# Patient Record
Sex: FEMALE | Race: ASIAN | NOT HISPANIC OR LATINO | URBAN - METROPOLITAN AREA
[De-identification: names, ages, dates, MRNs, and addresses within clinical notes are randomized per-mention and may not be internally consistent; named-entity substitution may affect disease eponyms.]

---

## 2024-03-27 ENCOUNTER — INPATIENT (INPATIENT)
Facility: HOSPITAL | Age: 39
LOS: 4 days | Discharge: HOME | DRG: 56 | End: 2024-04-01
Attending: PSYCHIATRY & NEUROLOGY | Admitting: PSYCHIATRY & NEUROLOGY
Payer: COMMERCIAL

## 2024-03-27 VITALS
OXYGEN SATURATION: 96 % | DIASTOLIC BLOOD PRESSURE: 86 MMHG | SYSTOLIC BLOOD PRESSURE: 122 MMHG | RESPIRATION RATE: 20 BRPM | HEART RATE: 87 BPM

## 2024-03-27 DIAGNOSIS — F41.8 OTHER SPECIFIED ANXIETY DISORDERS: ICD-10-CM

## 2024-03-27 DIAGNOSIS — R41.89 OTHER SYMPTOMS AND SIGNS INVOLVING COGNITIVE FUNCTIONS AND AWARENESS: ICD-10-CM

## 2024-03-27 DIAGNOSIS — Z87.898 PERSONAL HISTORY OF OTHER SPECIFIED CONDITIONS: ICD-10-CM

## 2024-03-27 DIAGNOSIS — Z29.9 ENCOUNTER FOR PROPHYLACTIC MEASURES, UNSPECIFIED: ICD-10-CM

## 2024-03-27 DIAGNOSIS — F42.9 OBSESSIVE-COMPULSIVE DISORDER, UNSPECIFIED: ICD-10-CM

## 2024-03-27 DIAGNOSIS — F29 UNSPECIFIED PSYCHOSIS NOT DUE TO A SUBSTANCE OR KNOWN PHYSIOLOGICAL CONDITION: ICD-10-CM

## 2024-03-27 LAB
ALBUMIN SERPL ELPH-MCNC: 3.3 G/DL — LOW (ref 3.4–5)
ALP SERPL-CCNC: 82 U/L — SIGNIFICANT CHANGE UP (ref 40–120)
ALT FLD-CCNC: 33 U/L — SIGNIFICANT CHANGE UP (ref 12–42)
ANION GAP SERPL CALC-SCNC: 10 MMOL/L — SIGNIFICANT CHANGE UP (ref 9–16)
APAP SERPL-MCNC: <2 UG/ML — LOW (ref 10–30)
APPEARANCE UR: ABNORMAL
AST SERPL-CCNC: 26 U/L — SIGNIFICANT CHANGE UP (ref 15–37)
BASOPHILS # BLD AUTO: 0.03 K/UL — SIGNIFICANT CHANGE UP (ref 0–0.2)
BASOPHILS NFR BLD AUTO: 0.4 % — SIGNIFICANT CHANGE UP (ref 0–2)
BILIRUB SERPL-MCNC: 0.4 MG/DL — SIGNIFICANT CHANGE UP (ref 0.2–1.2)
BILIRUB UR-MCNC: NEGATIVE — SIGNIFICANT CHANGE UP
BUN SERPL-MCNC: 9 MG/DL — SIGNIFICANT CHANGE UP (ref 7–23)
CALCIUM SERPL-MCNC: 8.9 MG/DL — SIGNIFICANT CHANGE UP (ref 8.5–10.5)
CHLORIDE SERPL-SCNC: 104 MMOL/L — SIGNIFICANT CHANGE UP (ref 96–108)
CO2 SERPL-SCNC: 25 MMOL/L — SIGNIFICANT CHANGE UP (ref 22–31)
COLOR SPEC: YELLOW — SIGNIFICANT CHANGE UP
CREAT SERPL-MCNC: 0.83 MG/DL — SIGNIFICANT CHANGE UP (ref 0.5–1.3)
DIFF PNL FLD: NEGATIVE — SIGNIFICANT CHANGE UP
EGFR: 93 ML/MIN/1.73M2 — SIGNIFICANT CHANGE UP
EOSINOPHIL # BLD AUTO: 0.21 K/UL — SIGNIFICANT CHANGE UP (ref 0–0.5)
EOSINOPHIL NFR BLD AUTO: 3 % — SIGNIFICANT CHANGE UP (ref 0–6)
ETHANOL SERPL-MCNC: <3 MG/DL — SIGNIFICANT CHANGE UP
FLUAV AG NPH QL: SIGNIFICANT CHANGE UP
FLUBV AG NPH QL: SIGNIFICANT CHANGE UP
GLUCOSE SERPL-MCNC: 107 MG/DL — HIGH (ref 70–99)
GLUCOSE UR QL: NEGATIVE MG/DL — SIGNIFICANT CHANGE UP
HCG UR QL: NEGATIVE — SIGNIFICANT CHANGE UP
HCT VFR BLD CALC: 41 % — SIGNIFICANT CHANGE UP (ref 34.5–45)
HGB BLD-MCNC: 14.7 G/DL — SIGNIFICANT CHANGE UP (ref 11.5–15.5)
IMM GRANULOCYTES NFR BLD AUTO: 0.3 % — SIGNIFICANT CHANGE UP (ref 0–0.9)
KETONES UR-MCNC: NEGATIVE MG/DL — SIGNIFICANT CHANGE UP
LACTATE BLDV-MCNC: 1.3 MMOL/L — SIGNIFICANT CHANGE UP (ref 0.5–2)
LEUKOCYTE ESTERASE UR-ACNC: ABNORMAL
LYMPHOCYTES # BLD AUTO: 1.73 K/UL — SIGNIFICANT CHANGE UP (ref 1–3.3)
LYMPHOCYTES # BLD AUTO: 24.9 % — SIGNIFICANT CHANGE UP (ref 13–44)
MCHC RBC-ENTMCNC: 32.5 PG — SIGNIFICANT CHANGE UP (ref 27–34)
MCHC RBC-ENTMCNC: 35.9 GM/DL — SIGNIFICANT CHANGE UP (ref 32–36)
MCV RBC AUTO: 90.7 FL — SIGNIFICANT CHANGE UP (ref 80–100)
MONOCYTES # BLD AUTO: 0.47 K/UL — SIGNIFICANT CHANGE UP (ref 0–0.9)
MONOCYTES NFR BLD AUTO: 6.8 % — SIGNIFICANT CHANGE UP (ref 2–14)
NEUTROPHILS # BLD AUTO: 4.48 K/UL — SIGNIFICANT CHANGE UP (ref 1.8–7.4)
NEUTROPHILS NFR BLD AUTO: 64.6 % — SIGNIFICANT CHANGE UP (ref 43–77)
NITRITE UR-MCNC: NEGATIVE — SIGNIFICANT CHANGE UP
NRBC # BLD: 0 /100 WBCS — SIGNIFICANT CHANGE UP (ref 0–0)
PCO2 BLDV: 42 MMHG — SIGNIFICANT CHANGE UP (ref 39–42)
PCP SPEC-MCNC: SIGNIFICANT CHANGE UP
PH BLDV: 7.44 — HIGH (ref 7.32–7.43)
PH UR: 7.5 — SIGNIFICANT CHANGE UP (ref 5–8)
PLATELET # BLD AUTO: 268 K/UL — SIGNIFICANT CHANGE UP (ref 150–400)
PO2 BLDV: 73 MMHG — HIGH (ref 25–45)
POTASSIUM SERPL-MCNC: 4.1 MMOL/L — SIGNIFICANT CHANGE UP (ref 3.5–5.3)
POTASSIUM SERPL-SCNC: 4.1 MMOL/L — SIGNIFICANT CHANGE UP (ref 3.5–5.3)
PROT SERPL-MCNC: 7.1 G/DL — SIGNIFICANT CHANGE UP (ref 6.4–8.2)
PROT UR-MCNC: NEGATIVE MG/DL — SIGNIFICANT CHANGE UP
RBC # BLD: 4.52 M/UL — SIGNIFICANT CHANGE UP (ref 3.8–5.2)
RBC # FLD: 11.4 % — SIGNIFICANT CHANGE UP (ref 10.3–14.5)
RSV RNA NPH QL NAA+NON-PROBE: SIGNIFICANT CHANGE UP
SALICYLATES SERPL-MCNC: 0.2 MG/DL — LOW (ref 2.8–20)
SAO2 % BLDV: 96.9 % — HIGH (ref 67–88)
SARS-COV-2 RNA SPEC QL NAA+PROBE: SIGNIFICANT CHANGE UP
SODIUM SERPL-SCNC: 139 MMOL/L — SIGNIFICANT CHANGE UP (ref 132–145)
SP GR SPEC: 1.01 — SIGNIFICANT CHANGE UP (ref 1–1.03)
UROBILINOGEN FLD QL: 0.2 MG/DL — SIGNIFICANT CHANGE UP (ref 0.2–1)
WBC # BLD: 6.94 K/UL — SIGNIFICANT CHANGE UP (ref 3.8–10.5)
WBC # FLD AUTO: 6.94 K/UL — SIGNIFICANT CHANGE UP (ref 3.8–10.5)

## 2024-03-27 PROCEDURE — 90792 PSYCH DIAG EVAL W/MED SRVCS: CPT | Mod: 95

## 2024-03-27 PROCEDURE — 70450 CT HEAD/BRAIN W/O DYE: CPT | Mod: 26,MC

## 2024-03-27 PROCEDURE — 99285 EMERGENCY DEPT VISIT HI MDM: CPT

## 2024-03-27 RX ORDER — OLANZAPINE 15 MG/1
5 TABLET, FILM COATED ORAL ONCE
Refills: 0 | Status: COMPLETED | OUTPATIENT
Start: 2024-03-27 | End: 2024-03-27

## 2024-03-27 RX ORDER — FLUVOXAMINE MALEATE 25 MG/1
1 TABLET ORAL
Refills: 0 | DISCHARGE

## 2024-03-27 RX ORDER — MIDAZOLAM HYDROCHLORIDE 1 MG/ML
2 INJECTION, SOLUTION INTRAMUSCULAR; INTRAVENOUS ONCE
Refills: 0 | Status: DISCONTINUED | OUTPATIENT
Start: 2024-03-27 | End: 2024-03-27

## 2024-03-27 RX ADMIN — OLANZAPINE 5 MILLIGRAM(S): 15 TABLET, FILM COATED ORAL at 12:37

## 2024-03-27 RX ADMIN — MIDAZOLAM HYDROCHLORIDE 2 MILLIGRAM(S): 1 INJECTION, SOLUTION INTRAMUSCULAR; INTRAVENOUS at 12:37

## 2024-03-27 NOTE — ED ADULT TRIAGE NOTE - CHIEF COMPLAINT QUOTE
patient BIBA from clinic for AMS/possible sepsis/UTI?; patient walked in from home confused, babbling as per EMS, not making sense; unkept appearance and foul odor noted on patient; hx psych disorders OCD, depression and addiction issues; babbling on EMS stretcher and trying to rip off vital signs equipment; father at bedside; unable to get temp as patient uncoopperative

## 2024-03-27 NOTE — ED ADULT NURSE NOTE - NSFALLRISKINTERV_ED_ALL_ED
Assistance OOB with selected safe patient handling equipment if applicable/Assistance with ambulation/Communicate fall risk and risk factors to all staff, patient, and family/Monitor gait and stability/Monitor for mental status changes and reorient to person, place, and time, as needed/Provide visual cue: yellow wristband, yellow gown, etc/Reinforce activity limits and safety measures with patient and family/Toileting schedule using arm’s reach rule for commode and bathroom/Use of alarms - bed, stretcher, chair and/or video monitoring/Call bell, personal items and telephone in reach/Instruct patient to call for assistance before getting out of bed/chair/stretcher/Non-slip footwear applied when patient is off stretcher/[place] to call system/Physically safe environment - no spills, clutter or unnecessary equipment/Purposeful Proactive Rounding/Room/bathroom lighting operational, light cord in reach

## 2024-03-27 NOTE — H&P ADULT - SOCIAL HISTORY: ALCOHOL USE
Alcohol use began Fall ‘22 (bottle of wine/day, began hiding the fact that she was drinking from others) up until 1 year ago. Pt now sober.

## 2024-03-27 NOTE — H&P ADULT - PROBLEM SELECTOR PLAN 2
PLAN:  - Order vEEG  - Order psychiatry consult PLAN:  - If severe agitation, olanzapine 5 mg PRN  - Order vEEG  - Order psychiatry consult

## 2024-03-27 NOTE — H&P ADULT - NSHPOUTPATIENTPROVIDERS_GEN_ALL_CORE
Pt's Psychiatrist: Dr. Jason Figueroa, reachable at [phone]  PMD Dr. Lozada (who has only seen patient once): [phone]

## 2024-03-27 NOTE — ED BEHAVIORAL HEALTH ASSESSMENT NOTE - HPI (INCLUDE ILLNESS QUALITY, SEVERITY, DURATION, TIMING, CONTEXT, MODIFYING FACTORS, ASSOCIATED SIGNS AND SYMPTOMS)
The patient is a 38-year-old female; domiciled with ; non-caregiver; PPHx per pt of ADHD, per collateral of OCD and anxiety, prior ?residential placements, no known SA; hx of alcohol use; no known PMHx; BIB EMS activated by outpatient lab; psychiatry consulted for evaluation.  Pt is a limited historian, somnolent s/p receiving PRN medications, with soft/mumbled speech so responses are unclear at times.  She reports feeling "good," states she is here because she is visiting.  When asked again she says she is here because of her dad's grandparents.  She reports PPHx of ADHD, prior admission (unclear where), in outpatient tx (unable to report names or location), denies PMHx, denies somatic complaints.    Collateral to be obtained as part of full safety risk assessment.    Writer left  for pt's psychiatrist (Dr. Jason Figueroa, [phone]).     Covid Screen - Patient  unable to assess The patient is a 38-year-old female; domiciled with ; non-caregiver; PPHx per pt of ADHD, per collateral of OCD and anxiety, prior ?residential placements, no known SA; hx of alcohol use; no known PMHx; BIB EMS activated by outpatient lab; psychiatry consulted for evaluation.  Pt is a limited historian, somnolent s/p receiving PRN medications, with soft/mumbled speech so responses are unclear at times.  She reports feeling "good," states she is here because she is visiting.  When asked again she says she is here because of her dad's grandparents.  She reports PPHx of ADHD, prior admission (unclear where), in outpatient tx (unable to report names or location), denies PMHx, denies somatic complaints.    Collateral to be obtained as part of full safety risk assessment.    Writer left  for pt's psychiatrist (Dr. Jason Figueroa, [phone]).     Writer spoke to pt's  (see  note for additional details obtained by [facility]).  He notes that pt's psychiatrist recommended pt come to the hospital for admission for higher level of care, pt has been deteriorating for months.  Pt's dad has been staying with pt during the day and collateral helps care for pt in evenings.  Pt previously had good self-care until a few months ago, now requires help to shower (if left on her own will not rinse out shampoo).  Pt's speech has changed, now more child-like, tone is different, limited to expressing her basic needs, otherwise gives one-word answers.  They tried to have pt see neurologist and get MRI but pt couldn't say still for full evaluation.  Lately, since they all had covid in January, pt seems more confused and disconnected.  At that time pt was sleeping 18-20 hours/day, now more active but still naps, wakes up confused, always wants cereal at whatever time she wakes up.  At times, pt will ask where his mother is (she is no longer staying with them).  Pt's gait has changed, walking more slowly over the past 2-3 months.  Pt was previously very active on [employer] until October, now goes for walks around the block 2-3x/day slowly.  He feels pt would benefit from admission at this time.    Covid Screen - Patient  unable to assess    Covid Screen - Collateral ()  he notes they had covid in January

## 2024-03-27 NOTE — ED PROVIDER NOTE - CLINICAL SUMMARY MEDICAL DECISION MAKING FREE TEXT BOX
+ stepwise cognitive decline over the course of several years, more prominent over the past few weeks, unresponsive to pharmacotherapy/psychiatric intervention in outpt setting, + frontotemporal wasting not characteristic of age, d/w Dr. John Mccrary of neurology admitting to [facility]. Psych following.

## 2024-03-27 NOTE — H&P ADULT - NSICDXFAMILYHX_GEN_ALL_CORE_FT
FAMILY HISTORY:  Uncle  Still living? Unknown  FH: mental illness, Age at diagnosis: Age Unknown

## 2024-03-27 NOTE — H&P ADULT - NSHPPHYSICALEXAM_GEN_ALL_CORE
GEN: NAD, pleasant, cooperative  CVS: RRR, no carotid bruit  CHEST: No signs of resp distress, on room air  ABD: Soft, NTTP  NEURO:  MENTAL STATUS: AAOx3, memory intact, fund of knowledge appropriate  LANG/SPEECH: Naming and repetition intact, fluent, follows 3-step commands  CRANIAL NERVES:   - II: Pupils equal and reactive, no RAPD, no VF deficits, normal fundus   - III, IV, VI: EOM intact, no gaze preference or deviation, no nystagmus.   - V: normal sensation in V1, V2, and V3 segments bilaterally   - VII: no asymmetry, no nasolabial fold flattening   - VIII: normal hearing to speech   - IX, X: normal palatal elevation, no uvular deviation   - XI: 5/5 head turn and 5/5 shoulder shrug bilaterally   - XII: midline tongue protrusion  MOTOR:   - 5/5 muscle power in Rt shoulder abductors/adductors, elbow flexors/extensors, wrist flexors/extensors, finger abductors/adductors.  5/5 in Rt hipflexors/extensors, knee flexors/extensors, ankle dorsiflexors and planter flexors.    - 5/5 muscle power in Lt shoulder abductors/adductors, elbow flexors/extensors, wrist flexors/extensors, finger abductors/adductors.  5/5 in Lt hipflexors/extensors, knee flexors/extensors, ankle dorsiflexors and planter flexors.  REFLEXES: 2/4 throughout, bilateral flexor planter response, no Corbin's, no clonus  SENSORY:  Normal to touch, pinprick, vibration, temp all limbs  No hemineglect, no extinction to double sided stimulation (visual & tactile)  Romberg absent  COORD: Normal finger to nose and heel to shin, no tremor, no dysmetria  STATION: normal stance, no truncal ataxia  GAIT: Normal; patient able to tip-toe, heel-walk. GEN: NAD, pleasant, cooperative  CVS: RRR, no carotid bruit  CHEST: No signs of resp distress, on room air  ABD: Soft, NTTP  NEURO:  MENTAL STATUS: Somnolent.   LANG/SPEECH: Naming and repetition intact, fluent, follows 3-step commands  CRANIAL NERVES:   - II: Pupils equal and reactive, no RAPD, no VF deficits   - III, IV, VI: EOM intact, no gaze preference or deviation, no nystagmus.   - V: normal sensation in V1, V2, and V3 segments bilaterally   - VII: no asymmetry, no nasolabial fold flattening   - VIII: normal hearing to speech   - IX, X: normal palatal elevation, no uvular deviation   - XI: 5/5 head turn and 5/5 shoulder shrug bilaterally   - XII: midline tongue protrusion  MOTOR:   - 5/5 muscle power in Rt shoulder abductors/adductors, elbow flexors/extensors, wrist flexors/extensors, finger abductors/adductors.  5/5 in Rt hipflexors/extensors, knee flexors/extensors, ankle dorsiflexors and planter flexors.    - 5/5 muscle power in Lt shoulder abductors/adductors, elbow flexors/extensors, wrist flexors/extensors, finger abductors/adductors.  5/5 in Lt hipflexors/extensors, knee flexors/extensors, ankle dorsiflexors and planter flexors.  REFLEXES: 1/4 throughout, bilateral flexor planter response, no Corbin's, no clonus  SENSORY:  Normal to touch all limbs  No hemineglect, no extinction to double sided stimulation (visual & tactile)  COORD: Non collaborative with maneuvers  STATION/GAIT: No assessed GEN: NAD, pleasant, cooperative  CVS: RRR, no carotid bruit  CHEST: No signs of resp distress, on room air  ABD: Soft, NTTP  NEURO:  MENTAL STATUS: Somnolent.   LANG/SPEECH: Naming and repetition intact, fluent, follows simple commands  CRANIAL NERVES:   - II: Pupils equal and reactive, no RAPD, no VF deficits   - III, IV, VI: EOM intact, no gaze preference or deviation, no nystagmus.   - V: normal sensation in V1, V2, and V3 segments bilaterally   - VII: no asymmetry, no nasolabial fold flattening   - VIII: normal hearing to speech   - IX, X: normal palatal elevation, no uvular deviation   - XI: 5/5 head turn and 5/5 shoulder shrug bilaterally   - XII: midline tongue protrusion  MOTOR:   - 5/5 muscle power in Rt shoulder abductors/adductors, elbow flexors/extensors, wrist flexors/extensors, finger abductors/adductors.  5/5 in Rt hipflexors/extensors, knee flexors/extensors, ankle dorsiflexors and planter flexors.    - 5/5 muscle power in Lt shoulder abductors/adductors, elbow flexors/extensors, wrist flexors/extensors, finger abductors/adductors.  5/5 in Lt hipflexors/extensors, knee flexors/extensors, ankle dorsiflexors and planter flexors.  REFLEXES: 1/4 throughout, bilateral flexor planter response, no Corbin's, no clonus  SENSORY:  Normal to touch all limbs  No hemineglect, no extinction to double sided stimulation (visual & tactile)  COORD: Non collaborative with maneuvers  STATION/GAIT: No assessed

## 2024-03-27 NOTE — H&P ADULT - NSICDXPASTMEDICALHX_GEN_ALL_CORE_FT
PAST MEDICAL HISTORY:  History of alcohol use disorder     Mixed anxiety and depressive disorder     OCD (obsessive compulsive disorder)

## 2024-03-27 NOTE — ED BEHAVIORAL HEALTH ASSESSMENT NOTE - SUMMARY
The patient is a 38-year-old female; domiciled with ; non-caregiver; PPHx per pt of ADHD, per collateral of OCD and anxiety, prior ?residential placements, no known SA; hx of alcohol use; no known PMHx; BIB EMS activated by outpatient lab; psychiatry consulted for evaluation. The patient is a 38-year-old female; domiciled with ; non-caregiver; PPHx per pt of ADHD, per collateral of OCD and anxiety, prior ?residential placements, no known SA; hx of alcohol use; no known PMHx; BIB EMS activated by outpatient lab; psychiatry consulted for evaluation.  Pt is a limited historian but per collateral pt has decompensated significantly over the past few months.  Will plan for psychiatric admission at this time for inability to care for self.  Per ED provider, pt is medically cleared but agrees to attempt to obtain CT head.

## 2024-03-27 NOTE — H&P ADULT - PROBLEM SELECTOR PLAN 4
On home meds: Fluvoxamine 150 mg QD and Klonopin 0.25 mg QD On home meds: Fluvoxamine 150 mg QHS and Klonopin 0.25 mg BID
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
## 2024-03-27 NOTE — ED ADULT NURSE REASSESSMENT NOTE - NS ED NURSE REASSESS COMMENT FT1
Unable to perform rectal temp on patient at this time. Patient agitated and restless. patient continues to pull on lines and IV. Dr. Ennis made aware. Care continues.

## 2024-03-27 NOTE — ED ADULT NURSE NOTE - OBJECTIVE STATEMENT
Patient is a 37 y/o F c/o ams. Patient bibems for altered mental status. Patient brought in from outpatient facility for inability or perform blood work. As per ems, patient was too agitated to perform blood work. As per patient's  patient became severely depressed during covid and developed extensive psych hx and gained over 35 pounds in one year. Patient aaox4 but babbles words repeatedly and is unable to focus or cooperate with staff. Patient brought in for psych eval. Patient unable to give pertinent psych history.

## 2024-03-27 NOTE — H&P ADULT - NSHPLABSRESULTS_GEN_ALL_CORE
LABS:                         14.7   6.94  )-----------( 268      ( 27 Mar 2024 11:56 )             41.0     03    139  |  104  |  9   ----------------------------<  107<H>  4.1   |  25  |  0.83    Ca    8.9      27 Mar 2024 11:56    TPro  7.1  /  Alb  3.3<L>  /  TBili  0.4  /  DBili  x   /  AST  26  /  ALT  33  /  AlkPhos  82        Urinalysis Basic - ( 27 Mar 2024 13:41 )    Color: Yellow / Appearance: Cloudy / S.007 / pH: x  Gluc: x / Ketone: Negative mg/dL  / Bili: Negative / Urobili: 0.2 mg/dL   Blood: x / Protein: Negative mg/dL / Nitrite: Negative   Leuk Esterase: Small / RBC: 1 /HPF / WBC 5 /HPF   Sq Epi: x / Non Sq Epi: x / Bacteria: Few /HPF                RADIOLOGY, EKG & ADDITIONAL TESTS:
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
## 2024-03-27 NOTE — ED PROVIDER NOTE - OBJECTIVE STATEMENT
38-year-old female with a history of alcohol use disorder (sober for 1 year), OCD, depression, anxiety with 2 prior psychiatric hospitalizations in the past year, currently taking Abilify 5 mg, Klonopin 0.25 mg, fluvoxamine 150 mg and memantine 10 mg. 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  Although patient has been sober for a year her functional status continues to decline, with a 35 pound weight gain in the past year, poor hygiene, and sleeping most of the day every day.  Patient has been unable to work for at least 2 years.  She is currently under the care of a psychotherapist who recommended that she report to a health care facility for further evaluation and likely admission as patient has been unresponsive to outpatient therapy and medications.  Per  patient has not been coughing or indicating pain of any kind.  No recent illness. 38-year-old female with a history of alcohol use disorder (sober for 1 year), OCD, depression, anxiety with 2 prior psychiatric hospitalizations in the past year, currently taking Abilify 5 mg, Klonopin 0.25 mg, fluvoxamine 150 mg and memantine 10 mg. 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  Although patient has been sober for a year her functional status continues to decline, with a 35 pound weight gain in the past year, poor hygiene, and sleeping most of the day every day.  Patient has been unable to work for at least 2 years.  She is currently under the care of a psychotherapist who recommended that she report to a health care facility for further evaluation and likely admission as patient has been unresponsive to outpatient therapy and medications.  Per  patient has not been coughing or indicating pain of any kind.  No recent illness.    Pt's Psychiatrist: Dr. Jason Figueroa, reachable at [phone] 38-year-old female with a history of alcohol use disorder (sober for 1 year), OCD, depression, anxiety with 2 prior psychiatric hospitalizations in the past year, currently taking Abilify 5 mg, Klonopin 0.25 mg, fluvoxamine 150 mg and memantine 10 mg. 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  Although patient has been sober for a year her functional status continues to decline, with a 35 pound weight gain in the past year, poor hygiene, and sleeping most of the day every day.  Patient has been unable to work for at least 2 years.  She is currently under the care of a psychotherapist who recommended that she report to a health care facility for further evaluation and likely admission as patient has been unresponsive to outpatient therapy and medications.  Per  patient has not been coughing or indicating pain of any kind.  No recent illness.    Pt's Psychiatrist: Dr. Jason Figueroa, reachable at [phone]  PMD Dr. Lozada (who has only seen patient once): [phone]
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
## 2024-03-27 NOTE — ED BEHAVIORAL HEALTH ASSESSMENT NOTE - RISK ASSESSMENT
risk factors: psych dx, unable to safety plan at this time, unemployed, limited functioning    protective factors: , domiciled, unable to assess other factors

## 2024-03-27 NOTE — H&P ADULT - ASSESSMENT
38-year-old female with a history of alcohol use disorder (sober for 1 year), OCD, depression, anxiety with 2 prior psychiatric hospitalizations in the past year, currently taking Abilify 5 mg, Klonopin 0.25 mg, fluvoxamine 150 mg and memantine 10 mg. Patient BIBEMS to [facility] for altered mental status. Patient brought in from outpatient facility for inability or perform blood work. As per EMS, patient was too agitated to perform blood work. Stepwise cognitive decline over the course of several years, more prominent over the past few weeks, unresponsive to pharmacotherapy/psychiatric intervention in outpt setting, CT head wo shows frontotemporal atrophy not characteristic of age. Psych following at [facility]. Transferred to [facility] after discussion with neurology attending Dr. Mccrary for further management.

## 2024-03-27 NOTE — ED PROVIDER NOTE - CARE PLAN
Principal Discharge DX:	Altered mental status  Secondary Diagnosis:	Frontotemporal brain disease   1

## 2024-03-27 NOTE — ED PROVIDER NOTE - PHYSICAL EXAMINATION
CONSTITUTIONAL: Well developed, flat affect, perseverating on L AC IV placement, poorly directable  ENT: Airway patent, Nasal mucosa clear. Mouth with normal mucosa.  EYES: Clear bilaterally.  RESPIRATORY: Breathing comfortably with normal RR.  CARDIO: RRR  MSK: Range of motion is not limited, no deformities noted.  NEURO: Alert and oriented, no focal deficits.  SKIN: Skin normal color for race, warm, dry and intact. No evidence of rash.  PSYCH: + flat affect, uncooperative with history taking

## 2024-03-27 NOTE — ED ADULT NURSE REASSESSMENT NOTE - NS ED NURSE REASSESS COMMENT FT1
Report received from Stormy WHITE for continuity of care. Pt in bed, on 1:1, asleep. ERT at bedside. No orders pending. Plan of care ongoing

## 2024-03-27 NOTE — H&P ADULT - PROBLEM SELECTOR PLAN 1
On home med: memantine 10 mg QD  PLAN:  - Order CRP, TSH, vit B1, B12, folate, RPR, HIV  - Order head MRI wo contrast  - Order PET scan  - Will consider LP after evaluating, MRI an EEG On home med: memantine 10 mg BID  PLAN:  - Order CRP, TSH, vit B1, B12, folate, RPR, HIV  - Order head MRI wo contrast  - Order PET scan  - Will consider LP after evaluating, MRI an EEG On home med: memantine 10 mg BID  PLAN:  - Order CRP, TSH, vit B1, B12, folate, RPR, HIV  - Order head MRI w/ contrast  - Order PET scan  - Will consider LP after evaluating, MRI an EEG

## 2024-03-27 NOTE — ED ADULT NURSE REASSESSMENT NOTE - NS ED NURSE REASSESS COMMENT FT1
Received handoff from Issa Watkins RN. Patient in no acute distress. Patient sleeping-rise and fall of chest noted, breathing unlabored. Patient maintained on constant observation. All needs met at this time. Care continues.

## 2024-03-27 NOTE — H&P ADULT - HISTORY OF PRESENT ILLNESS
38-year-old female with a history of alcohol use disorder (sober for 1 year), OCD, depression, anxiety with 2 prior psychiatric hospitalizations in the past year, currently taking Abilify 5 mg, Klonopin 0.25 mg, fluvoxamine 150 mg and memantine 10 mg. Patient BIBEMS to [facility] for altered mental status. Patient brought in from outpatient facility for inability or perform blood work. As per EMS, patient was too agitated to perform blood work. Patient was aaox4 but babbles words repeatedly and is unable to focus or cooperate with staff. Patient brought in for psych eval. Patient unable to give pertinent psych history.     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  Although patient has been sober for a year her functional status continues to decline, with a 35 pound weight gain in the past year, poor hygiene, and sleeping most of the day every day.  Patient has been unable to work for at least 2 years.  She is currently under the care of a psychotherapist who recommended that she report to a health care facility for further evaluation and likely admission as patient has been unresponsive to outpatient therapy and medications.  Per  patient has not been coughing or indicating pain of any kind.  No recent illness.     Stepwise cognitive decline over the course of several years, more prominent over the past few weeks, unresponsive to pharmacotherapy/psychiatric intervention in outpt setting, Psych following at [facility].    In [facility] ED:  - Vitals: Normal  - Labs: Normal  - UA: cloudy urine  - Utox: + BZD  - CT head wo: frontotemporal atrophy not characteristic of age.     Transferred to [facility] after discussion with neurology attending Dr. Mccrary for further management.  38-year-old female with a history of alcohol use disorder (sober for 1 year), OCD, depression, anxiety with 2 prior psychiatric hospitalizations in the past year (follows with psychiatrist Dr. Hernandez, last visit decreased fluvoxamine and Abilify 1-2 weeks ago), currently taking Abilify 2.5 mg QHS, Klonopin 0.25 mg BID, fluvoxamine 150 mg QHS and memantine 10 mg BID. Patient BIBEMS to [facility] for altered mental status. Patient brought in from outpatient facility for inability or perform blood work. As per EMS, patient was too agitated to perform blood work. Patient was aaox4 but babbles words repeatedly and is unable to focus or cooperate with staff. Patient brought in for psych eval. Patient unable to give pertinent psych history.     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 In March and April 2023 patient was admitted for 1 month in 2 different mental health clinics. Although patient has been sober for a year her functional status continues to decline, with a 35 pound weight gain in the past year, poor hygiene, and sleeping most of the day every day. Her  describes how patient becomes more childish, changing her voice and behaving inappropriately. Posteriorly, during the nexts months declining gradually in cognition, activity, memory, personality and calculation, and being more apathetic. In January 2024, patient got worse in a stepwise manner and started to walk worse and fall more often. Patient has been unable to work for at least 2 years.  She is currently under the care of a psychotherapist who recommended that she report to a health care facility for further evaluation and likely admission as patient has been unresponsive to outpatient therapy and medications.  Per  patient has not been coughing or indicating pain of any kind.  No recent illness.     Stepwise cognitive decline over the course of several years, more prominent over the past few weeks, unresponsive to pharmacotherapy/psychiatric intervention in outpt setting, Psych following at [facility].    In [facility] ED:  - Vitals: Normal  - Labs: Normal  - UA: cloudy urine  - Utox: + BZD  - CT head wo: frontotemporal atrophy not characteristic of age.     Transferred to [facility] after discussion with neurology attending Dr. Mccrary for further management.

## 2024-03-28 LAB
A1C WITH ESTIMATED AVERAGE GLUCOSE RESULT: 5.2 % — SIGNIFICANT CHANGE UP (ref 4–5.6)
ALBUMIN SERPL ELPH-MCNC: 4 G/DL — SIGNIFICANT CHANGE UP (ref 3.3–5)
ALP SERPL-CCNC: 79 U/L — SIGNIFICANT CHANGE UP (ref 40–120)
ALT FLD-CCNC: 27 U/L — SIGNIFICANT CHANGE UP (ref 10–45)
ANION GAP SERPL CALC-SCNC: 9 MMOL/L — SIGNIFICANT CHANGE UP (ref 5–17)
APPEARANCE CSF: CLEAR — SIGNIFICANT CHANGE UP
APPEARANCE SPUN FLD: COLORLESS — SIGNIFICANT CHANGE UP
APTT BLD: 33.5 SEC — SIGNIFICANT CHANGE UP (ref 24.5–35.6)
AST SERPL-CCNC: 25 U/L — SIGNIFICANT CHANGE UP (ref 10–40)
BILIRUB SERPL-MCNC: 0.7 MG/DL — SIGNIFICANT CHANGE UP (ref 0.2–1.2)
BUN SERPL-MCNC: 13 MG/DL — SIGNIFICANT CHANGE UP (ref 7–23)
CALCIUM SERPL-MCNC: 10 MG/DL — SIGNIFICANT CHANGE UP (ref 8.4–10.5)
CHLORIDE SERPL-SCNC: 106 MMOL/L — SIGNIFICANT CHANGE UP (ref 96–108)
CHOLEST SERPL-MCNC: 189 MG/DL — SIGNIFICANT CHANGE UP
CO2 SERPL-SCNC: 27 MMOL/L — SIGNIFICANT CHANGE UP (ref 22–31)
COLOR CSF: SIGNIFICANT CHANGE UP
CREAT SERPL-MCNC: 0.79 MG/DL — SIGNIFICANT CHANGE UP (ref 0.5–1.3)
CRP SERPL-MCNC: <3 MG/L — SIGNIFICANT CHANGE UP (ref 0–4)
CSF PCR RESULT: SIGNIFICANT CHANGE UP
EGFR: 99 ML/MIN/1.73M2 — SIGNIFICANT CHANGE UP
ESTIMATED AVERAGE GLUCOSE: 103 MG/DL — SIGNIFICANT CHANGE UP (ref 68–114)
FOLATE SERPL-MCNC: 6.6 NG/ML — SIGNIFICANT CHANGE UP
GLUCOSE CSF-MCNC: 63 MG/DL — SIGNIFICANT CHANGE UP (ref 40–70)
GLUCOSE SERPL-MCNC: 94 MG/DL — SIGNIFICANT CHANGE UP (ref 70–99)
GRAM STN FLD: SIGNIFICANT CHANGE UP
HCT VFR BLD CALC: 42.3 % — SIGNIFICANT CHANGE UP (ref 34.5–45)
HDLC SERPL-MCNC: 52 MG/DL — SIGNIFICANT CHANGE UP
HGB BLD-MCNC: 14.6 G/DL — SIGNIFICANT CHANGE UP (ref 11.5–15.5)
HIV 1+2 AB+HIV1 P24 AG SERPL QL IA: SIGNIFICANT CHANGE UP
INR BLD: 0.95 — SIGNIFICANT CHANGE UP (ref 0.85–1.18)
LDH SERPL L TO P-CCNC: 155 U/L — SIGNIFICANT CHANGE UP (ref 50–242)
LIPID PNL WITH DIRECT LDL SERPL: 123 MG/DL — HIGH
LYMPHOCYTES # CSF: 1 % — LOW (ref 40–80)
MCHC RBC-ENTMCNC: 32 PG — SIGNIFICANT CHANGE UP (ref 27–34)
MCHC RBC-ENTMCNC: 34.5 GM/DL — SIGNIFICANT CHANGE UP (ref 32–36)
MCV RBC AUTO: 92.8 FL — SIGNIFICANT CHANGE UP (ref 80–100)
NEUTROPHILS # CSF: 0 % — SIGNIFICANT CHANGE UP (ref 0–6)
NON HDL CHOLESTEROL: 137 MG/DL — HIGH
NRBC # BLD: 0 /100 WBCS — SIGNIFICANT CHANGE UP (ref 0–0)
NRBC NFR CSF: 1 /UL — SIGNIFICANT CHANGE UP (ref 0–5)
PLATELET # BLD AUTO: 278 K/UL — SIGNIFICANT CHANGE UP (ref 150–400)
POTASSIUM SERPL-MCNC: 3.9 MMOL/L — SIGNIFICANT CHANGE UP (ref 3.5–5.3)
POTASSIUM SERPL-SCNC: 3.9 MMOL/L — SIGNIFICANT CHANGE UP (ref 3.5–5.3)
PROT CSF-MCNC: 36 MG/DL — SIGNIFICANT CHANGE UP (ref 15–45)
PROT SERPL-MCNC: 6.5 G/DL — SIGNIFICANT CHANGE UP (ref 6–8.3)
PROTHROM AB SERPL-ACNC: 10.9 SEC — SIGNIFICANT CHANGE UP (ref 9.5–13)
RBC # BLD: 4.56 M/UL — SIGNIFICANT CHANGE UP (ref 3.8–5.2)
RBC # CSF: 6 /UL — HIGH (ref 0–0)
RBC # FLD: 11.8 % — SIGNIFICANT CHANGE UP (ref 10.3–14.5)
SODIUM SERPL-SCNC: 142 MMOL/L — SIGNIFICANT CHANGE UP (ref 135–145)
SPECIMEN SOURCE: SIGNIFICANT CHANGE UP
TRIGL SERPL-MCNC: 78 MG/DL — SIGNIFICANT CHANGE UP
TSH SERPL-MCNC: 4.12 UIU/ML — SIGNIFICANT CHANGE UP (ref 0.27–4.2)
TUBE TYPE: SIGNIFICANT CHANGE UP
VIT B12 SERPL-MCNC: 577 PG/ML — SIGNIFICANT CHANGE UP (ref 232–1245)
WBC # BLD: 7 K/UL — SIGNIFICANT CHANGE UP (ref 3.8–10.5)
WBC # FLD AUTO: 7 K/UL — SIGNIFICANT CHANGE UP (ref 3.8–10.5)

## 2024-03-28 PROCEDURE — 99254 IP/OBS CNSLTJ NEW/EST MOD 60: CPT

## 2024-03-28 PROCEDURE — 62270 DX LMBR SPI PNXR: CPT

## 2024-03-28 PROCEDURE — 90791 PSYCH DIAGNOSTIC EVALUATION: CPT

## 2024-03-28 PROCEDURE — 95718 EEG PHYS/QHP 2-12 HR W/VEEG: CPT

## 2024-03-28 PROCEDURE — 99232 SBSQ HOSP IP/OBS MODERATE 35: CPT | Mod: 25

## 2024-03-28 RX ORDER — LIDOCAINE HCL 20 MG/ML
10 VIAL (ML) INJECTION ONCE
Refills: 0 | Status: COMPLETED | OUTPATIENT
Start: 2024-03-28 | End: 2024-03-28

## 2024-03-28 RX ORDER — ENOXAPARIN SODIUM 100 MG/ML
40 INJECTION SUBCUTANEOUS EVERY 24 HOURS
Refills: 0 | Status: DISCONTINUED | OUTPATIENT
Start: 2024-03-28 | End: 2024-04-01

## 2024-03-28 RX ORDER — MEMANTINE HYDROCHLORIDE 10 MG/1
1 TABLET ORAL
Refills: 0 | DISCHARGE

## 2024-03-28 RX ORDER — ARIPIPRAZOLE 15 MG/1
0.5 TABLET ORAL
Refills: 0 | DISCHARGE

## 2024-03-28 RX ORDER — INFLUENZA VIRUS VACCINE 15; 15; 15; 15 UG/.5ML; UG/.5ML; UG/.5ML; UG/.5ML
0.5 SUSPENSION INTRAMUSCULAR ONCE
Refills: 0 | Status: DISCONTINUED | OUTPATIENT
Start: 2024-03-28 | End: 2024-04-01

## 2024-03-28 RX ORDER — CLONAZEPAM 1 MG
0.25 TABLET ORAL
Refills: 0 | Status: DISCONTINUED | OUTPATIENT
Start: 2024-03-28 | End: 2024-04-01

## 2024-03-28 RX ORDER — MEMANTINE HYDROCHLORIDE 10 MG/1
10 TABLET ORAL
Refills: 0 | Status: DISCONTINUED | OUTPATIENT
Start: 2024-03-28 | End: 2024-04-01

## 2024-03-28 RX ORDER — OLANZAPINE 15 MG/1
5 TABLET, FILM COATED ORAL DAILY
Refills: 0 | Status: DISCONTINUED | OUTPATIENT
Start: 2024-03-28 | End: 2024-04-01

## 2024-03-28 RX ORDER — ENOXAPARIN SODIUM 100 MG/ML
40 INJECTION SUBCUTANEOUS EVERY 24 HOURS
Refills: 0 | Status: DISCONTINUED | OUTPATIENT
Start: 2024-03-28 | End: 2024-03-28

## 2024-03-28 RX ORDER — FLUVOXAMINE MALEATE 25 MG/1
150 TABLET ORAL AT BEDTIME
Refills: 0 | Status: DISCONTINUED | OUTPATIENT
Start: 2024-03-28 | End: 2024-04-01

## 2024-03-28 RX ORDER — ARIPIPRAZOLE 15 MG/1
2.5 TABLET ORAL AT BEDTIME
Refills: 0 | Status: DISCONTINUED | OUTPATIENT
Start: 2024-03-28 | End: 2024-04-01

## 2024-03-28 RX ORDER — ARIPIPRAZOLE 15 MG/1
1 TABLET ORAL
Refills: 0 | DISCHARGE

## 2024-03-28 RX ORDER — CLONAZEPAM 1 MG
1 TABLET ORAL
Refills: 0 | DISCHARGE

## 2024-03-28 RX ADMIN — MEMANTINE HYDROCHLORIDE 10 MILLIGRAM(S): 10 TABLET ORAL at 17:44

## 2024-03-28 RX ADMIN — ARIPIPRAZOLE 2.5 MILLIGRAM(S): 15 TABLET ORAL at 22:34

## 2024-03-28 RX ADMIN — Medication 10 MILLILITER(S): at 14:49

## 2024-03-28 RX ADMIN — Medication 0.25 MILLIGRAM(S): at 17:59

## 2024-03-28 RX ADMIN — ENOXAPARIN SODIUM 40 MILLIGRAM(S): 100 INJECTION SUBCUTANEOUS at 22:33

## 2024-03-28 RX ADMIN — Medication 0.25 MILLIGRAM(S): at 07:24

## 2024-03-28 RX ADMIN — OLANZAPINE 5 MILLIGRAM(S): 15 TABLET, FILM COATED ORAL at 07:30

## 2024-03-28 RX ADMIN — FLUVOXAMINE MALEATE 150 MILLIGRAM(S): 25 TABLET ORAL at 22:34

## 2024-03-28 RX ADMIN — MEMANTINE HYDROCHLORIDE 10 MILLIGRAM(S): 10 TABLET ORAL at 07:24
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
## 2024-03-28 NOTE — PROGRESS NOTE ADULT - ATTENDING COMMENTS
39 yo W with about 2 years of progressive neuropsychiatric symptoms, starting with anxiety and OCD and progressing to cognitive impairment. Has been seeing psychiatrists and had at least 2 inpatient psychiatric hospitalizations, currently on clonazepam, fluvoxamine, abilify.   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 On exam today she is somnolent but arousable, oriented to person, year, and her age, but not month or date. Knows she is in the hospital but not which one. She has psychomotor slowing and bradykinesia but no cogwheel rigidity and remainder of neurologic exam is unremarkable.   No family history of early onset dementia  Differential includes early onset frontotemporal dementia, lashonda disease, guy's disease (although less likely given no tremor), autoimmune encephalopathy, prior disease (although time course more chronic)  LP performed today with normal protein, glucose, cell count  Will get MRI brain w/ contrast, PET Brain, f/u remaining CSF studies (14-3-3, RT quic, Tau)  Send FTD and HTT genetic testing  f/u psychiatry recommendations  Discussed at length with  at bedside 37 yo W with about 2 years of progressive neuropsychiatric symptoms, starting with anxiety and OCD and progressing to cognitive impairment. Has been seeing psychiatrists and had at least 2 inpatient psychiatric hospitalizations, currently on clonazepam, fluvoxamine, abilify.   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 On exam today she is somnolent but arousable, oriented to person, year, and her age, but not month or date. Knows she is in the hospital but not which one. She has psychomotor slowing and bradykinesia but no cogwheel rigidity and remainder of neurologic exam is unremarkable.   No family history of early onset dementia  Differential includes early onset frontotemporal dementia, lashonda disease, guy's disease (although less likely given no tremor), autoimmune encephalopathy, prion disease (although would expect more rapid time course)  LP performed today with normal protein, glucose, cell count  Will get MRI brain w/ contrast, PET Brain, f/u remaining CSF studies (14-3-3, RT quic, Tau)  Send FTD panel and HTT genetic testing  f/u psychiatry recommendations  Discussed at length with  at bedside

## 2024-03-28 NOTE — PROGRESS NOTE ADULT - ASSESSMENT
38-year-old female with a history of alcohol use disorder (sober for 1 year), OCD, depression, anxiety with 2 prior psychiatric hospitalizations in the past year, currently taking Abilify 5 mg, Klonopin 0.25 mg, fluvoxamine 150 mg and memantine 10 mg. Patient BIBEMS to [facility] for altered mental status. Patient brought in from outpatient facility for inability or perform blood work. As per EMS, patient was too agitated to perform blood work. Stepwise cognitive decline over the course of several years, more prominent over the past few weeks, unresponsive to pharmacotherapy/psychiatric intervention in outpt setting, CT head wo shows frontotemporal atrophy not characteristic of age. Psych following at [facility]. Transferred to [facility] after discussion with neurology attending Dr. Mccrary for further management.        Problem/Plan - 1:  ·  Problem: Cognitive impairment.   ·  Plan: On home med: memantine 10 mg BID  PLAN:  - Order CRP, TSH, vit B1, B12, folate, RPR, HIV  - Order head MRI wo contrast  - Order PET scan  - Will consider LP after evaluating, MRI an EEG.     Problem/Plan - 2:  ·  Problem: Psychosis, unspecified psychosis type.   ·  Plan: PLAN:  - If severe agitation, olanzapine 5 mg PRN  - Order vEEG  - Order psychiatry consult.     Problem/Plan - 3:  ·  Problem: OCD (obsessive compulsive disorder).   ·  Plan: On home meds: Abilify 2.5 mg QHS.     Problem/Plan - 4:  ·  Problem: Mixed anxiety and depressive disorder.   ·  Plan: On home meds: Fluvoxamine 150 mg QHS and Klonopin 0.25 mg BID.     Problem/Plan - 5:  ·  Problem: History of alcohol use disorder.   ·  Plan: No active. Possible cognitive impairment related.     Problem/Plan - 6:  ·  Problem: Preventive measure.   ·  Plan: Regular diet  Replete electrolytes  DVT prophylaxis  Full code  Dispo: [facility]. 38-year-old female with a history of alcohol use disorder (sober for 1 year), OCD, depression, anxiety with 2 prior psychiatric hospitalizations in the past year, currently taking Abilify 5 mg, Klonopin 0.25 mg, fluvoxamine 150 mg and memantine 10 mg. Patient BIBEMS to [facility] for altered mental status. Patient brought in from outpatient facility for inability or perform blood work. As per EMS, patient was too agitated to perform blood work. Stepwise cognitive decline over the course of several years, more prominent over the past few weeks, unresponsive to pharmacotherapy/psychiatric intervention in outpt setting, CT head wo shows frontotemporal atrophy not characteristic of age concerning for potential frontotemporal dementia. Alternative diagnoses include [place]'s disease, prion disease (eg. Creutzfelt-Gilson disease), or autoimmune encephalitis. Psych following at [facility]. Transferred to [facility] after discussion with neurology attending Dr. Mccrary for further management.        Problem/Plan - 1:  ·  Problem: Progressive cognitive impairment.   ·  Plan: On home med: memantine 10 mg BID  PLAN:  - Order CRP, TSH, vit B1, B12, folate, RPR, HIV  - Order head MRI wo contrast  - Order PET scan  - Will consider LP after evaluating, MRI an EEG.     Problem/Plan - 2:  ·  Problem: Psychosis, unspecified psychosis type.   ·  Plan: PLAN:  - If severe agitation, olanzapine 5 mg PRN  - Order vEEG  - Order psychiatry consult.     Problem/Plan - 3:  ·  Problem: OCD (obsessive compulsive disorder).   ·  Plan: On home meds: Abilify 2.5 mg QHS.     Problem/Plan - 4:  ·  Problem: Mixed anxiety and depressive disorder.   ·  Plan: On home meds: Fluvoxamine 150 mg QHS and Klonopin 0.25 mg BID.     Problem/Plan - 5:  ·  Problem: History of alcohol use disorder.   ·  Plan: No active. Possible cognitive impairment related.     Problem/Plan - 6:  ·  Problem: Preventive measure.   ·  Plan: Regular diet  Replete electrolytes  DVT prophylaxis  Full code  Dispo: [facility]. 38-year-old female with a history of alcohol use disorder (sober for 1 year), OCD, depression, anxiety with 2 prior psychiatric hospitalizations in the past year, currently taking Abilify 5 mg, Klonopin 0.25 mg, fluvoxamine 150 mg and memantine 10 mg. Patient BIBEMS to [facility] for altered mental status. Patient brought in from outpatient facility for inability or perform blood work. As per EMS, patient was too agitated to perform blood work. Stepwise cognitive decline over the course of several years, more prominent over the past few weeks, unresponsive to pharmacotherapy/psychiatric intervention in outpt setting, CT head wo shows frontotemporal atrophy not characteristic of age concerning for potential frontotemporal dementia. Alternative diagnoses include [place]'s disease, prion disease (eg. Creutzfelt-Gilson disease), or autoimmune encephalitis. Psych following at [facility]. Transferred to [facility] after discussion with neurology attending Dr. Mccrary for further management.        Problem/Plan - 1:  ·  Problem: Progressive cognitive impairment.   PLAN:  - CT Head on 3/26 shows atrophy of the anterior frontal and temporal lobes   - Psych f/o with patient on 3/28  · On home med: continue memantine 10 mg BID  - Serum workup using frontotemporal dementia panel   - Lumbar puncture for evaluation of possible prion disease (eg. 14-3-3, RT-QuIC, tau) and encephalopathy   - Genetic testing for possible [place]'s disease   - PET scan   - MRI w/ contrast     Problem/Plan - 2:  ·  Problem: Psychosis, unspecified psychosis type.   PLAN:  - If severe agitation, olanzapine 5 mg PRN  - Order vEEG     Problem/Plan - 3:  ·  Problem: OCD (obsessive compulsive disorder).   ·  Plan: On home meds: Abilify 2.5 mg QHS.     Problem/Plan - 4:  ·  Problem: Mixed anxiety and depressive disorder.   ·  Plan: On home meds: Fluvoxamine 150 mg QHS and Klonopin 0.25 mg BID.     Problem/Plan - 5:  ·  Problem: History of alcohol use disorder.   ·  Plan: No active. Possible cognitive impairment related.     Problem/Plan - 6:  ·  Problem: Preventive measure.   ·  Plan: Regular diet  Replete electrolytes  DVT prophylaxis  Full code  Dispo: [facility]. 38-year-old female with a history of alcohol use disorder (sober for 1 year), OCD, depression, anxiety with 2 prior psychiatric hospitalizations in the past year, currently taking Abilify 5 mg, Klonopin 0.25 mg, fluvoxamine 150 mg and memantine 10 mg. Patient BIBEMS to [facility] for altered mental status. Patient brought in from outpatient facility for inability or perform blood work. As per EMS, patient was too agitated to perform blood work. Stepwise cognitive decline over the course of several years, more prominent over the past few weeks, unresponsive to pharmacotherapy/psychiatric intervention in outpt setting, CT head wo shows frontotemporal atrophy not characteristic of age concerning for potential frontotemporal dementia. Alternative diagnoses include [place]'s disease, prion disease (eg. Creutzfelt-Gilson disease), or autoimmune encephalitis. Psych following at [facility]. Transferred to [facility] after discussion with neurology attending Dr. Mccrary for further management.        Problem/Plan - 1:  ·  Problem: Progressive cognitive impairment.   PLAN:  - CT Head on 3/26 shows atrophy of the anterior frontal and temporal lobes   - Psych f/o with patient on 3/28  · On home med: continue memantine 10 mg BID  - Genetic testing - frontotemporal dementia panel and HTT gene  - Lumbar puncture for evaluation of possible prion disease (eg. 14-3-3, RT-QuIC, tau), autoimmune process  - PET scan   - MRI w/ contrast     Problem/Plan - 2:  ·  Problem: Psychosis, unspecified psychosis type.   PLAN:  - If severe agitation, olanzapine 5 mg PRN  - Order vEEG     Problem/Plan - 3:  ·  Problem: OCD (obsessive compulsive disorder).   ·  Plan: On home meds: Abilify 2.5 mg QHS.     Problem/Plan - 4:  ·  Problem: Mixed anxiety and depressive disorder.   ·  Plan: On home meds: Fluvoxamine 150 mg QHS and Klonopin 0.25 mg BID.     Problem/Plan - 5:  ·  Problem: History of alcohol use disorder.   ·  Plan: No active. Possible cognitive impairment related.     Problem/Plan - 6:  ·  Problem: Preventive measure.   ·  Plan: Regular diet  Replete electrolytes  DVT prophylaxis  Full code  Dispo: [facility].

## 2024-03-28 NOTE — PROGRESS NOTE ADULT - SUBJECTIVE AND OBJECTIVE BOX
Neurology Progress Note    Interval History:    Patient seen and examined today. No events overnight.     HPI:  38-year-old female with a history of alcohol use disorder (sober for 1 year), OCD, depression, anxiety with 2 prior psychiatric hospitalizations in the past year (follows with psychiatrist Dr. Hernandez, last visit decreased fluvoxamine and Abilify 1-2 weeks ago), currently taking Abilify 2.5 mg QHS, Klonopin 0.25 mg BID, fluvoxamine 150 mg QHS and memantine 10 mg BID. Patient BIBEMS to [facility] for altered mental status. Patient brought in from outpatient facility for inability or perform blood work. As per EMS, patient was too agitated to perform blood work. Patient was aaox4 but babbles words repeatedly and is unable to focus or cooperate with staff. Patient brought in for psych eval. Patient unable to give pertinent psych history.     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 In March and April 2023 patient was admitted for 1 month in 2 different mental health clinics. Although patient has been sober for a year her functional status continues to decline, with a 35 pound weight gain in the past year, poor hygiene, and sleeping most of the day every day. Her  describes how patient becomes more childish, changing her voice and behaving inappropriately. Posteriorly, during the nexts months declining gradually in cognition, activity, memory, personality and calculation, and being more apathetic. In January 2024, patient got worse in a stepwise manner and started to walk worse and fall more often. Patient has been unable to work for at least 2 years.  She is currently under the care of a psychotherapist who recommended that she report to a health care facility for further evaluation and likely admission as patient has been unresponsive to outpatient therapy and medications.  Per  patient has not been coughing or indicating pain of any kind.  No recent illness.     Stepwise cognitive decline over the course of several years, more prominent over the past few weeks, unresponsive to pharmacotherapy/psychiatric intervention in outpt setting, Psych following at [facility].    In [facility] ED:  - Vitals: Normal  - Labs: Normal  - UA: cloudy urine  - Utox: + BZD  - CT head wo: frontotemporal atrophy not characteristic of age.     Transferred to [facility] after discussion with neurology attending Dr. Mccrary for further management.  (27 Mar 2024 22:33)      PAST MEDICAL & SURGICAL HISTORY:  OCD (obsessive compulsive disorder)    Mixed anxiety and depressive disorder    History of alcohol use disorder    No significant past surgical history            Medications:  ARIPiprazole 2.5 milliGRAM(s) Oral at bedtime  clonazePAM Oral Disintegrating Tablet 0.25 milliGRAM(s) Oral two times a day  fluvoxaMINE 150 milliGRAM(s) Oral at bedtime  influenza   Vaccine 0.5 milliLiter(s) IntraMuscular once  lidocaine 2% Injectable 10 milliLiter(s) Local Injection once  LORazepam   Injectable 1 milliGRAM(s) IV Push once  memantine 10 milliGRAM(s) Oral two times a day  OLANZapine Disintegrating Tablet 5 milliGRAM(s) Oral daily PRN      Vital Signs Last 24 Hrs  T(C): 36.5 (28 Mar 2024 11:53), Max: 36.8 (27 Mar 2024 21:37)  T(F): 97.7 (28 Mar 2024 11:53), Max: 98.3 (28 Mar 2024 06:30)  HR: 85 (28 Mar 2024 11:53) (69 - 106)  BP: 95/67 (28 Mar 2024 11:53) (93/61 - 113/77)  BP(mean): 72 (27 Mar 2024 23:10) (72 - 72)  RR: 18 (28 Mar 2024 11:53) (16 - 18)  SpO2: 96% (28 Mar 2024 11:53) (94% - 97%)    Parameters below as of 28 Mar 2024 11:53  Patient On (Oxygen Delivery Method): room air        Neurological Exam:   Mental status: Awake, alert and oriented x3.  Recent and remote memory intact.  Naming, repetition and comprehension intact.  Attention/concentration intact.  No dysarthria, no aphasia.  Fund of knowledge appropriate.    Cranial nerves: Pupils equally round and reactive to light, visual fields full, no nystagmus, extraocular muscles intact, V1 through V3 intact bilaterally and symmetric, face symmetric, hearing intact to finger rub, palate elevation symmetric, tongue was midline.  Motor:  MRC grading 5/5 b/l UE/LE.   strength 5/5.  Normal tone and bulk.  No abnormal movements.    Sensation: Intact to light touch, proprioception, and pinprick.   Coordination: No dysmetria on finger-to-nose and heel-to-shin.  No dysdiadokinesia.  Reflexes: 2+ in bilateral UE/LE, downgoing toes bilaterally. (-) Corbin.  Gait: Narrow and steady. No ataxia.  Romberg negative    Labs:  CBC Full  -  ( 28 Mar 2024 05:30 )  WBC Count : 7.00 K/uL  RBC Count : 4.56 M/uL  Hemoglobin : 14.6 g/dL  Hematocrit : 42.3 %  Platelet Count - Automated : 278 K/uL  Mean Cell Volume : 92.8 fl  Mean Cell Hemoglobin : 32.0 pg  Mean Cell Hemoglobin Concentration : 34.5 gm/dL  Auto Neutrophil # : x  Auto Lymphocyte # : x  Auto Monocyte # : x  Auto Eosinophil # : x  Auto Basophil # : x  Auto Neutrophil % : x  Auto Lymphocyte % : x  Auto Monocyte % : x  Auto Eosinophil % : x  Auto Basophil % : x    03-28    142  |  106  |  13  ----------------------------<  94  3.9   |  27  |  0.79    Ca    10.0      28 Mar 2024 05:30    TPro  6.5  /  Alb  4.0  /  TBili  0.7  /  DBili  x   /  AST  25  /  ALT  27  /  AlkPhos  79  03-28    LIVER FUNCTIONS - ( 28 Mar 2024 05:30 )  Alb: 4.0 g/dL / Pro: 6.5 g/dL / ALK PHOS: 79 U/L / ALT: 27 U/L / AST: 25 U/L / GGT: x             Urinalysis Basic - ( 28 Mar 2024 05:30 )    Color: x / Appearance: x / SG: x / pH: x  Gluc: 94 mg/dL / Ketone: x  / Bili: x / Urobili: x   Blood: x / Protein: x / Nitrite: x   Leuk Esterase: x / RBC: x / WBC x   Sq Epi: x / Non Sq Epi: x / Bacteria: x        Radiology: Reviewed Neurology Progress Note    Interval History:    Patient seen and examined today. She appears moderately somnolent and tends to perseverate that she "wants to go" (home) or asking "can I go" (home). She is able to follow simple commands on physical exam but has trouble following commands with 2 or more steps.     HPI:  38-year-old female with a history of alcohol use disorder (sober for 1 year), OCD, depression, anxiety with 2 prior psychiatric hospitalizations in the past year (follows with psychiatrist Dr. Hernandez, last visit decreased fluvoxamine and Abilify 1-2 weeks ago), currently taking Abilify 2.5 mg QHS, Klonopin 0.25 mg BID, fluvoxamine 150 mg QHS and memantine 10 mg BID. Patient BIBEMS to [facility] for altered mental status. Patient brought in from outpatient facility for inability or perform blood work. As per EMS, patient was too agitated to perform blood work. Patient was aaox4 but babbles words repeatedly and is unable to focus or cooperate with staff. Patient brought in for psych eval. Patient unable to give pertinent psych history.     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 In March and April 2023 patient was admitted for 1 month in 2 different mental health clinics. Although patient has been sober for a year her functional status continues to decline, with a 35 pound weight gain in the past year, poor hygiene, and sleeping most of the day every day. Her  describes how patient becomes more childish, changing her voice and behaving inappropriately. Posteriorly, during the nexts months declining gradually in cognition, activity, memory, personality and calculation, and being more apathetic. In January 2024, patient got worse in a stepwise manner and started to walk worse and fall more often. Patient has been unable to work for at least 2 years.  She is currently under the care of a psychotherapist who recommended that she report to a health care facility for further evaluation and likely admission as patient has been unresponsive to outpatient therapy and medications.  Per  patient has not been coughing or indicating pain of any kind.  No recent illness.     Stepwise cognitive decline over the course of several years, more prominent over the past few weeks, unresponsive to pharmacotherapy/psychiatric intervention in outpt setting, Psych following at [facility].    In [facility] ED:  - Vitals: Normal  - Labs: Normal  - UA: cloudy urine  - Utox: + BZD  - CT head wo: frontotemporal atrophy not characteristic of age.     Transferred to [facility] after discussion with neurology attending Dr. Mccrary for further management.  (27 Mar 2024 22:33)      PAST MEDICAL & SURGICAL HISTORY:  OCD (obsessive compulsive disorder)    Mixed anxiety and depressive disorder    History of alcohol use disorder    No significant past surgical history            Medications:  ARIPiprazole 2.5 milliGRAM(s) Oral at bedtime  clonazePAM Oral Disintegrating Tablet 0.25 milliGRAM(s) Oral two times a day  fluvoxaMINE 150 milliGRAM(s) Oral at bedtime  influenza   Vaccine 0.5 milliLiter(s) IntraMuscular once  lidocaine 2% Injectable 10 milliLiter(s) Local Injection once  LORazepam   Injectable 1 milliGRAM(s) IV Push once  memantine 10 milliGRAM(s) Oral two times a day  OLANZapine Disintegrating Tablet 5 milliGRAM(s) Oral daily PRN      Vital Signs Last 24 Hrs  T(C): 36.5 (28 Mar 2024 11:53), Max: 36.8 (27 Mar 2024 21:37)  T(F): 97.7 (28 Mar 2024 11:53), Max: 98.3 (28 Mar 2024 06:30)  HR: 85 (28 Mar 2024 11:53) (69 - 106)  BP: 95/67 (28 Mar 2024 11:53) (93/61 - 113/77)  BP(mean): 72 (27 Mar 2024 23:10) (72 - 72)  RR: 18 (28 Mar 2024 11:53) (16 - 18)  SpO2: 96% (28 Mar 2024 11:53) (94% - 97%)    Parameters below as of 28 Mar 2024 11:53  Patient On (Oxygen Delivery Method): room air        Neurological Exam:   Mental status: She is awake and alert but is oriented only to self and not location or time (AAO x1). Recent and remote memory intact.  Naming, repetition and comprehension intact.  Mild-moderate impairment in attention as there is difficulty following two-step commands.  No dysarthria, no aphasia.  She does not know who the current president is.   Cranial nerves: Pupils equally round and reactive to light, no nystagmus, extraocular muscles intact, V1 through V3 intact bilaterally and symmetric, face symmetric, hearing intact to finger rub. Shoulder strength intact. Unable to cooperate with exam for evaluating visual fields, jaw closing (motor V), palate elevation, or tongue movement.  Motor:  Strength is 5/5 bilaterally with wrist flexion/extension, forearm flexion/extension, leg flexion/extension, dorsiflexion/plantarflexion.   strength 5/5.  Normal tone and bulk.  No abnormal movements.    Sensation: Intact to light touch in the upper and lower extremities bilaterally.   Coordination: No dysmetria on finger-to-nose and able to perform rapid alternating movements with repeated pronation/supination (No dysdiadokinesia).  Reflexes: 2+ in bilateral UE/LE, downgoing toes bilaterally. (-) Corbin.  Gait: Narrow and steady. No ataxia.  Romberg negative    Labs:  CBC Full  -  ( 28 Mar 2024 05:30 )  WBC Count : 7.00 K/uL  RBC Count : 4.56 M/uL  Hemoglobin : 14.6 g/dL  Hematocrit : 42.3 %  Platelet Count - Automated : 278 K/uL  Mean Cell Volume : 92.8 fl  Mean Cell Hemoglobin : 32.0 pg  Mean Cell Hemoglobin Concentration : 34.5 gm/dL  Auto Neutrophil # : x  Auto Lymphocyte # : x  Auto Monocyte # : x  Auto Eosinophil # : x  Auto Basophil # : x  Auto Neutrophil % : x  Auto Lymphocyte % : x  Auto Monocyte % : x  Auto Eosinophil % : x  Auto Basophil % : x    03-28    142  |  106  |  13  ----------------------------<  94  3.9   |  27  |  0.79    Ca    10.0      28 Mar 2024 05:30    TPro  6.5  /  Alb  4.0  /  TBili  0.7  /  DBili  x   /  AST  25  /  ALT  27  /  AlkPhos  79  03-28    LIVER FUNCTIONS - ( 28 Mar 2024 05:30 )  Alb: 4.0 g/dL / Pro: 6.5 g/dL / ALK PHOS: 79 U/L / ALT: 27 U/L / AST: 25 U/L / GGT: x             Urinalysis Basic - ( 28 Mar 2024 05:30 )    Color: x / Appearance: x / SG: x / pH: x  Gluc: 94 mg/dL / Ketone: x  / Bili: x / Urobili: x   Blood: x / Protein: x / Nitrite: x   Leuk Esterase: x / RBC: x / WBC x   Sq Epi: x / Non Sq Epi: x / Bacteria: x        Radiology: Reviewed Neurology Progress Note    Interval History:    Patient seen and examined today. She appears moderately somnolent and tends to perseverate that she "wants to go" (home) or asking "can I go" (home). She is able to follow simple commands on physical exam but has trouble following commands with 2 or more steps.     Medications:  ARIPiprazole 2.5 milliGRAM(s) Oral at bedtime  clonazePAM Oral Disintegrating Tablet 0.25 milliGRAM(s) Oral two times a day  fluvoxaMINE 150 milliGRAM(s) Oral at bedtime  influenza   Vaccine 0.5 milliLiter(s) IntraMuscular once  lidocaine 2% Injectable 10 milliLiter(s) Local Injection once  LORazepam   Injectable 1 milliGRAM(s) IV Push once  memantine 10 milliGRAM(s) Oral two times a day  OLANZapine Disintegrating Tablet 5 milliGRAM(s) Oral daily PRN      Vital Signs Last 24 Hrs  T(C): 36.5 (28 Mar 2024 11:53), Max: 36.8 (27 Mar 2024 21:37)  T(F): 97.7 (28 Mar 2024 11:53), Max: 98.3 (28 Mar 2024 06:30)  HR: 85 (28 Mar 2024 11:53) (69 - 106)  BP: 95/67 (28 Mar 2024 11:53) (93/61 - 113/77)  BP(mean): 72 (27 Mar 2024 23:10) (72 - 72)  RR: 18 (28 Mar 2024 11:53) (16 - 18)  SpO2: 96% (28 Mar 2024 11:53) (94% - 97%)    Parameters below as of 28 Mar 2024 11:53  Patient On (Oxygen Delivery Method): room air        Neurological Exam:   Mental status: She is awake and alert but is oriented only to self and not location or time (AAO x1). Recent and remote memory intact.  Naming, repetition and comprehension intact.  impairment in attention as there is difficulty following two-step commands.  No dysarthria, no aphasia.  She does not know who the current president is.   Cranial nerves: Pupils equally round and reactive to light, no nystagmus, extraocular muscles intact, V1 through V3 intact bilaterally and symmetric, face symmetric, hearing intact to finger rub. Shoulder strength intact. Unable to cooperate with exam for evaluating visual fields, jaw closing (motor V), palate elevation, or tongue movement.  Motor:  Strength is 5/5 bilaterally with wrist flexion/extension, forearm flexion/extension, leg flexion/extension, dorsiflexion/plantarflexion.   strength 5/5.  Normal tone and bulk.  No abnormal movements.    Sensation: Intact to light touch in the upper and lower extremities bilaterally.   Coordination: No dysmetria on finger-to-nose and able to perform rapid alternating movements with repeated pronation/supination (No dysdiadokinesia).  Reflexes: 2+ in bilateral UE/LE, downgoing toes bilaterally. (-) Corbin.  Gait: Narrow and steady. No ataxia.  Romberg negative    Labs:  CBC Full  -  ( 28 Mar 2024 05:30 )  WBC Count : 7.00 K/uL  RBC Count : 4.56 M/uL  Hemoglobin : 14.6 g/dL  Hematocrit : 42.3 %  Platelet Count - Automated : 278 K/uL  Mean Cell Volume : 92.8 fl  Mean Cell Hemoglobin : 32.0 pg  Mean Cell Hemoglobin Concentration : 34.5 gm/dL  Auto Neutrophil # : x  Auto Lymphocyte # : x  Auto Monocyte # : x  Auto Eosinophil # : x  Auto Basophil # : x  Auto Neutrophil % : x  Auto Lymphocyte % : x  Auto Monocyte % : x  Auto Eosinophil % : x  Auto Basophil % : x    03-28    142  |  106  |  13  ----------------------------<  94  3.9   |  27  |  0.79    Ca    10.0      28 Mar 2024 05:30    TPro  6.5  /  Alb  4.0  /  TBili  0.7  /  DBili  x   /  AST  25  /  ALT  27  /  AlkPhos  79  03-28    LIVER FUNCTIONS - ( 28 Mar 2024 05:30 )  Alb: 4.0 g/dL / Pro: 6.5 g/dL / ALK PHOS: 79 U/L / ALT: 27 U/L / AST: 25 U/L / GGT: x             Urinalysis Basic - ( 28 Mar 2024 05:30 )    Color: x / Appearance: x / SG: x / pH: x  Gluc: 94 mg/dL / Ketone: x  / Bili: x / Urobili: x   Blood: x / Protein: x / Nitrite: x   Leuk Esterase: x / RBC: x / WBC x   Sq Epi: x / Non Sq Epi: x / Bacteria: x        Radiology: Reviewed

## 2024-03-28 NOTE — PHYSICAL THERAPY INITIAL EVALUATION ADULT - MODALITIES TREATMENT COMMENTS
R hand dominant; (L) hand  5/5, (R) hand  5/5. CN Testing: B/L Frontalis intact; B/L buccinator intact; smile symmetrical; tongue protrusion at midline; B/L eyes open/close intact; Shoulder elevation: intact bilaterally; Vision H-Test: ARIC 2/2 cognition; Convergence/Divergence: ARIC 2/2 cognition, Vision Quadrant Test: ARIC 2/2 cognition

## 2024-03-28 NOTE — BH CONSULTATION LIAISON PROGRESS NOTE - NSBHFUPINTERVALCCFT_PSY_A_CORE
Pt was unable to articulate why she was in the hospital, she was focused on wanting to be discharged, stating " I just want to go home." Per pt's , pt has been cognitively and behaviorally declining for months, and pt's psychiatrist recommended pt come to the hospital for admission of higher care and to receive a psychiatric and medical evaluation

## 2024-03-28 NOTE — CONSULT NOTE ADULT - ASSESSMENT
{\rtf1\vfgbql70060\ansi\pfmitkh3745\ftnbj\uc1\deff0  {\fonttbl{\f0 \fnil Segoe UI;}{\f1 \fnil \fcharset0 Segoe UI;}{\f2 \fnil Times New Jaren;}}  {\colortbl ;\xxl562\zyaxh836\nyhe000 ;\red0\green0\blue0 ;\red0\green0\qzpd150 ;\red0\green0\blue0 ;}  {\stylesheet{\f0\fs20 Normal;}{\cs1 Default Paragraph Font;}{\cs2\f0\fs16 Line Number;}{\cs3\f2\fs24\ul\cf3 Hyperlink;}}  {\*\revtbl{Unknown;}}  \zfkkil66946\xtlpum53450\glgar3721\spxka1815\cwcoh9444\rcdsd6574\ugiktxm249\cbkbiaa531\nogrowautofit\qnxrug441\formshade\nofeaturethrottle1\dntblnsbdb\fet4\aendnotes\aftnnrlc\pgbrdrhead\pgbrdrfoot  \sectd\mmxfqs92213\bplalr84879\guttersxn0\htgigyav2176\emcjxpta7135\psrwcbbi5545\kbnrdnms4538\sruzumb397\ohfgjve066\sbkpage\pgncont\pgndec  \plain\plain\f0\fs24\ql\plain\f0\fs24\plain\f0\fs20\ohyo0055\hich\f0\dbch\f0\loch\f0\fs20\par  I M\par  \par  38 year old female with a history of alcohol use disorder (sober for 1 year), OCD, depression, anxiety with 2 prior psychiatric hospitalizations in the past year, currently taking Abilify 5 mg, Klonopin 0.25 mg, fluvoxamine 150 mg and memantine 10 mg.   Patient BIBEMS to [facility] for altered mental status. Patient brought in from outpatient facility for inability or perform blood work. As per EMS, patient was too agitated to perform blood work. Stepwise cognitive decline over the course of several years,   more prominent over the past few weeks, unresponsive to pharmacotherapy/psychiatric intervention in outpt setting, CT head wo shows frontotemporal atrophy not characteristic of age. Psych following at [facility]. Transferred to [facility] after discussion with neurology   attending Dr. Mccrary for further management. \par  \par  \plain\f1\fs20\ufqh6941\hich\f1\dbch\f1\loch\f1\cf2\fs20\ul{\field{\*\fldinst HYPERLINK [id number] }{\fldrslt Problem/Plan - 1:}}\plain\f0\fs20\uvfs7620\hich\f0\dbch\f0\loch\f0\fs20\ql\par  \'b7  {\*\bkmkstart [id number]}{\*\bkmkend [id number]}Problem: {\*\bkmkstart [id number]}{\*\bkmkend [id number]}Cognitive impairment. \par  \'b7  {\*\bkmkstart [id number]}{\*\bkmkend [id number]}Plan: {\*\bkmkstart [id number]}{\*\bkmkend sp48615637964}On home med: memantine 10 mg BID\par  PLAN:\par  - Order CRP, TSH, vit B1, B12, folate, RPR, HIV\par  - Order head MRI wo contrast\par  - Order PET scan\par  - Will consider LP after evaluating, MRI an EEG.\par  \par  \plain\f1\fs20\kbcd1862\hich\f1\dbch\f1\loch\f1\cf2\fs20\ul{\field{\*\fldinst HYPERLINK [id number] }{\fldrslt Problem/Plan - 2:}}\plain\f0\fs20\orhn8379\hich\f0\dbch\f0\loch\f0\fs20\ql\par  \'b7  {\*\bkmkstart [id number]}{\*\bkmkend [id number]}Problem: {\*\bkmkstart [id number]}{\*\bkmkend jx11466098299}Psychosis, unspecified psychosis type. \par  \'b7  {\*\bkmkstart [id number]}{\*\bkmkend oo70051918709}Plan: {\*\bkmkstart jw76615381995}{\*\bkmkend re37288161884}PLAN:\par  - If severe agitation, olanzapine 5 mg PRN\par  - Order vEEG\par  - Order psychiatry consult.\plain\f1\fs20\tjjc7587\hich\f1\dbch\f1\loch\f1\cf2\fs20\strike\plain\f0\fs20\pswg1340\hich\f0\dbch\f0\loch\f0\fs20\par  \par  \plain\f1\fs20\vetl6864\hich\f1\dbch\f1\loch\f1\cf2\fs20\ul{\field{\*\fldinst HYPERLINK [id number] }{\fldrslt Problem/Plan - 3:}}\plain\f0\fs20\lavm2489\hich\f0\dbch\f0\loch\f0\fs20\ql\par  \'b7  {\*\bkmkstart [id number]}{\*\bkmkend fc37271440026}Problem: {\*\bkmkstart kk16158557749}{\*\bkmkend ge74219685538}OCD (obsessive compulsive disorder). \par  \'b7  {\*\bkmkstart gy91799400195}{\*\bkmkend hm53447453782}Plan: {\*\bkmkstart [id number]}{\*\bkmkend iv50052366825}On home meds: Abilify 2.5 mg QHS.\par  \par  \plain\f1\fs20\ylgx4671\hich\f1\dbch\f1\loch\f1\cf2\fs20\ul{\field{\*\fldinst HYPERLINK [id number] }{\fldrslt Problem/Plan - 4:}}\plain\f0\fs20\hqez0705\hich\f0\dbch\f0\loch\f0\fs20\ql\par  \'b7  {\*\bkmkstart xf37169701882}{\*\bkmkend ex56583793923}Problem: {\*\bkmkstart lq54507636804}{\*\bkmkend wi35438439907}Mixed anxiety and depressive disorder. \par  \'b7  {\*\bkmkstart [id number]}{\*\bkmkend ss81890508431}Plan: {\*\bkmkstart mx31719572224}{\*\bkmkend vs53679575815}On home meds: Fluvoxamine 150 mg QHS and Klonopin 0.25 mg BID.\par  \par  \plain\f1\fs20\ynli8501\hich\f1\dbch\f1\loch\f1\cf2\fs20\ul{\field{\*\fldinst HYPERLINK [id number] }{\fldrslt Problem/Plan - 5:}}\plain\f0\fs20\daqs4446\hich\f0\dbch\f0\loch\f0\fs20\ql\par  \'b7  {\*\bkmkstart ua19288335655}{\*\bkmkend zl69093019992}Problem: {\*\bkmkstart xn77769122822}{\*\bkmkend gt62232649868}History of alcohol use disorder. \par  \'b7  {\*\bkmkstart uc16784192058}{\*\bkmkend xw44668818846}Plan: {\*\bkmkstart re13093871731}{\*\bkmkend ys16696175516}No active. Possible cognitive impairment related.\par  \par  \plain\f1\fs20\csvd4136\hich\f1\dbch\f1\loch\f1\cf2\fs20\ul{\field{\*\fldinst HYPERLINK [id number] }{\fldrslt Problem/Plan - 6:}}\plain\f0\fs20\hadv3196\hich\f0\dbch\f0\loch\f0\fs20\ql\par  \'b7  {\*\bkmkstart hn49458513525}{\*\bkmkend iz31236210987}Problem: {\*\bkmkstart bg37469426539}{\*\bkmkend bw93955583613}Preventive measure. \par  \'b7  {\*\bkmkstart uo11902308545}{\*\bkmkend ee18499455259}Plan: {\*\bkmkstart hr49169845457}{\*\bkmkend ic07939442630}Regular diet\par  Replete electrolytes\par  DVT prophylaxis\par  Full code\par  Dispo: RMF.\par  \par  }

## 2024-03-28 NOTE — BH CONSULTATION LIAISON PROGRESS NOTE - NSBHATTESTCOMMENTATTENDFT_PSY_A_CORE
38-year-old female with a history of alcohol use disorder (sober for 1 year), OCD, depression, anxiety with 2 prior partial hospitalizations ([facility] and [place] for [facility]) in the past year (follows with psychiatrist Dr. Figueroa, last visit decreased fluvoxamine and Abilify 1-2 weeks ago), currently taking Abilify 2.5 mg QHS, Klonopin 0.25 mg BID, fluvoxamine 150 mg QHS and memantine 10 mg BID. Patient BIBEMS to [facility] for altered mental status. Patient brought in from outpatient facility for inability or perform blood work due to agitation.    On today's eval, pt appearing drowsy, unwilling/unable to answer most questions. Found to be disoriented to time, psychomotor slowed, slowed speech, indicating evidence of cognitive impairment.  Collateral from  confirms rapid development of OCD around [id number] and then heavy alcohol use around 2021 without any prior psychiatric history. Cognitive decline has occurred since 2022, with more rapid development in the last 6 months.  Imaging (CT head) showing frontal and temporal atrophy.  The above indicates likely a neurocognitive disorder, with psychiatric symptoms stemming from an organic cause.  Agree with expansive neurological workup.  Psychiatry will continue to follow. detailed exam

## 2024-03-28 NOTE — PHYSICAL THERAPY INITIAL EVALUATION ADULT - PERTINENT HX OF CURRENT PROBLEM, REHAB EVAL
38-year-old female with a history of alcohol use disorder (sober for 1 year), OCD, depression, anxiety with 2 prior psychiatric hospitalizations in the past year (follows with psychiatrist Dr. Hernandez, last visit decreased fluvoxamine and Abilify 1-2 weeks ago), currently taking Abilify 2.5 mg QHS, Klonopin 0.25 mg BID, fluvoxamine 150 mg QHS and memantine 10 mg BID. Patient BIBEMS to [facility] for altered mental status. Patient brought in from outpatient facility for inability or perform blood work. As per EMS, patient was too agitated to perform blood work. Patient was aaox4 but babbles words repeatedly and is unable to focus or cooperate with staff. Patient brought in for psych eval. Patient unable to give pertinent psych history.     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 In March and April 2023 patient was admitted for 1 month in 2 different mental health clinics. Although patient has been sober for a year her functional status continues to decline, with a 35 pound weight gain in the past year, poor hygiene, and sleeping most of the day every day. Her  describes how patient becomes more childish, changing her voice and behaving inappropriately. Posteriorly, during the nexts months declining gradually in cognition, activity, memory, personality and calculation, and being more apathetic. In January 2024, patient got worse in a stepwise manner and started to walk worse and fall more often. Patient has been unable to work for at least 2 years.  She is currently under the care of a psychotherapist who recommended that she report to a health care facility for further evaluation and likely admission as patient has been unresponsive to outpatient therapy and medications.  Per  patient has not been coughing or indicating pain of any kind.  No recent illness.     Stepwise cognitive decline over the course of several years, more prominent over the past few weeks, unresponsive to pharmacotherapy/psychiatric intervention in outpt setting, Psych following at [facility].

## 2024-03-28 NOTE — OCCUPATIONAL THERAPY INITIAL EVALUATION ADULT - NS ASR FOLLOW COMMAND OT EVAL
max re-directive cues as pt perseverating on wanting "candy cane" (candy  had in backpack)/50% of the time/able to follow single-step instructions

## 2024-03-28 NOTE — PROCEDURE NOTE - ADDITIONAL PROCEDURE DETAILS
Patient was moving through the procedure, and was holded by 2 people but was still moving, however CSF obtained successfully with no complications

## 2024-03-28 NOTE — OCCUPATIONAL THERAPY INITIAL EVALUATION ADULT - DIAGNOSIS, OT EVAL
Pt admitted for AMS presents with impaired cognition in regards to attention, safety awareness, and recall impacting ability to perform ADLs and functional mobility tasks at OF.

## 2024-03-28 NOTE — CONSULT NOTE ADULT - ASSESSMENT
39 yo F with a hx of alcohol use disorder (sober for 1 year), OCD, depression, anxiety with 2 prior psychiatric hospitalizations in the past year, currently taking Abilify 5 mg, Klonopin 0.25 mg, fluvoxamine 150 mg and memantine 10 mg who presented for progressive worsening of functional status and encephalopathy.    #Metabolic encephalopathy  - admitted to neuro for workup of progressive decline in mental status  - no signs of infection, WBC and CRP wnl  - plan per primary team, plan for MRI, LP  - TSH wnl    #Depression/anxiety   - BH following Abilify, Clonazepam, Fluvoxamine, and Memantine    DVT ppx: SCDs 39 yo F with a hx of alcohol use disorder (sober for 1 year), OCD, depression, anxiety with 2 prior psychiatric hospitalizations in the past year, currently taking Abilify 5 mg, Klonopin 0.25 mg, fluvoxamine 150 mg and memantine 10 mg who presented for progressive worsening of functional status and encephalopathy.    #Metabolic encephalopathy  - admitted to neuro for workup of progressive decline in mental status  - no signs of infection, WBC and CRP wnl  - plan per primary team, plan for MRI, LP  - TSH wnl  - frontal and temporal atrophy noted on CT  - HIV negative    #Depression/anxiety   - BH following Abilify, Clonazepam, Fluvoxamine, and Memantine    DVT ppx: SCDs

## 2024-03-28 NOTE — PROCEDURE NOTE - NSINFORMCONSENT_GEN_A_CORE
3 HOUR STABLE DIALYSIS WITH 2000 ML FLUID OFF.  EPO GIVE
Patient completed 3.5 hours of hemodialysis today. Net today UF removed 1020 mL.
Pt parisa 2 hour hemodialysis well. One liter net fluid removed.
Pt parisa 3 hour hemodialysis well.  2 liters net fluid removed.
Benefits, risks, and possible complications of procedure explained to patient/caregiver who verbalized understanding and gave written consent.

## 2024-03-28 NOTE — PHYSICAL THERAPY INITIAL EVALUATION ADULT - PHYSICAL ASSIST/NONPHYSICAL ASSIST: SUPINE/SIT, REHAB EVAL
Progress Note - Text



The patient is a 79-year-old gentleman who is post quadruple coronary artery 
bypass grafting.  He is presently sitting up in his room in a chair.  He does 
not appear to be in any distress but does state he feels somewhat short of 
breath when he first wakes up in the morning.  Denies any unusual chest pain.  
No nausea or vomiting.  Patient does have underlying severe COPD and previous 
smoking history.



Vital signs blood temperature 97.8 with a pulse of 69 and respirations 16.  
Blood pressure is 93/54 and he is 94% saturated on 3 L.

Lungs are generally clear.

Heart tones were irregular but rate is controlled.

No unusual edema.

No neurological changes.



Laboratory

White count 15 with a hemoglobin 8.5 and a platelet count of 190.

INR is 1.4.

Sodium is 134 with a potassium 4.2.  CO2 content is 32.  BUN is 21 with 
creatinine of 0.7.  Albumin is 3.1.



Impressions and plans 

Patient continues to improve slowly post coronary artery bypass surgery.

Discharge planning in progress for possible extended care rehab.



[facility] cues/nonverbal cues (demo/gestures)

## 2024-03-28 NOTE — CONSULT NOTE ADULT - SUBJECTIVE AND OBJECTIVE BOX
Patient is a 38y old  Female who presents with a chief complaint of Altered mental status and progressive cognitive impairment (27 Mar 2024 22:33)      HPI:  38-year-old female with a history of alcohol use disorder (sober for 1 year), OCD, depression, anxiety with 2 prior psychiatric hospitalizations in the past year (follows with psychiatrist Dr. Hernandez, last visit decreased fluvoxamine and Abilify 1-2 weeks ago), currently taking Abilify 2.5 mg QHS, Klonopin 0.25 mg BID, fluvoxamine 150 mg QHS and memantine 10 mg BID. Patient BIBEMS to [facility] for altered mental status. Patient brought in from outpatient facility for inability or perform blood work. As per EMS, patient was too agitated to perform blood work. Patient was aaox4 but babbles words repeatedly and is unable to focus or cooperate with staff. Patient brought in for psych eval. Patient unable to give pertinent psych history.     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  During lockdown in 2022, after losing her job, patient became severely depressed and developed a phobia of germs in healthcare settings which devolved into severe depressive episodes during which time patient became dependent on alcohol until 2023. In March and 2023 patient was admitted for 1 month in 2 different mental health clinics. Although patient has been sober for a year her functional status continues to decline, with a 35 pound weight gain in the past year, poor hygiene, and sleeping most of the day every day. Her  describes how patient becomes more childish, changing her voice and behaving inappropriately. Posteriorly, during the nexts months declining gradually in cognition, activity, memory, personality and calculation, and being more apathetic. In 2024, patient got worse in a stepwise manner and started to walk worse and fall more often. Patient has been unable to work for at least 2 years.  She is currently under the care of a psychotherapist who recommended that she report to a health care facility for further evaluation and likely admission as patient has been unresponsive to outpatient therapy and medications.  Per  patient has not been coughing or indicating pain of any kind.  No recent illness.     Stepwise cognitive decline over the course of several years, more prominent over the past few weeks, unresponsive to pharmacotherapy/psychiatric intervention in outpt setting, Psych following at [facility].    In [facility] ED:  - Vitals: Normal  - Labs: Normal  - UA: cloudy urine  - Utox: + BZD  - CT head wo: frontotemporal atrophy not characteristic of age.     Transferred to [facility] after discussion with neurology attending Dr. Mccrary for further management.  (27 Mar 2024 22:33)    PAST MEDICAL & SURGICAL HISTORY:  OCD (obsessive compulsive disorder)      Mixed anxiety and depressive disorder      History of alcohol use disorder      No significant past surgical history        MEDICATIONS  (STANDING):  ARIPiprazole 2.5 milliGRAM(s) Oral at bedtime  clonazePAM Oral Disintegrating Tablet 0.25 milliGRAM(s) Oral two times a day  fluvoxaMINE 150 milliGRAM(s) Oral at bedtime  influenza   Vaccine 0.5 milliLiter(s) IntraMuscular once  memantine 10 milliGRAM(s) Oral two times a day    MEDICATIONS  (PRN):  OLANZapine Disintegrating Tablet 5 milliGRAM(s) Oral daily PRN Severe agitation      FAMILY HISTORY:  FH: mental illness (Uncle)        CBC Full  -  ( 27 Mar 2024 11:56 )  WBC Count : 6.94 K/uL  RBC Count : 4.52 M/uL  Hemoglobin : 14.7 g/dL  Hematocrit : 41.0 %  Platelet Count - Automated : 268 K/uL  Mean Cell Volume : 90.7 fl  Mean Cell Hemoglobin : 32.5 pg  Mean Cell Hemoglobin Concentration : 35.9 gm/dL  Auto Neutrophil # : 4.48 K/uL  Auto Lymphocyte # : 1.73 K/uL  Auto Monocyte # : 0.47 K/uL  Auto Eosinophil # : 0.21 K/uL  Auto Basophil # : 0.03 K/uL  Auto Neutrophil % : 64.6 %  Auto Lymphocyte % : 24.9 %  Auto Monocyte % : 6.8 %  Auto Eosinophil % : 3.0 %  Auto Basophil % : 0.4 %          139  |  104  |  9   ----------------------------<  107<H>  4.1   |  25  |  0.83    Ca    8.9      27 Mar 2024 11:56    TPro  7.1  /  Alb  3.3<L>  /  TBili  0.4  /  DBili  x   /  AST  26  /  ALT  33  /  AlkPhos  82        Urinalysis Basic - ( 27 Mar 2024 13:41 )    Color: Yellow / Appearance: Cloudy / S.007 / pH: x  Gluc: x / Ketone: Negative mg/dL  / Bili: Negative / Urobili: 0.2 mg/dL   Blood: x / Protein: Negative mg/dL / Nitrite: Negative   Leuk Esterase: Small / RBC: 1 /HPF / WBC 5 /HPF   Sq Epi: x / Non Sq Epi: x / Bacteria: Few /HPF        Radiology :     < from: CT Head No Cont (24 @ 18:01) >    ACC: [id number] EXAM:  CT BRAIN   ORDERED BY: ELOINA MEDINA     PROCEDURE DATE:  2024          INTERPRETATION:  CT head without IV contrast    CLINICAL INFORMATION: Altered mental status    TECHNIQUE: Contiguous axial 5 mm sections were obtained through the head.   Sagittal and coronal 2-D reformatted images were also obtained.   This   scan was performed using automatic exposure control (radiation dose   reduction software) to obtain a diagnostic image quality scan with   patient dose as low as reasonably achievable.    FINDINGS:   No previous examinations are available for review.    The brain demonstrates no abnormal attenuation.   No acute cerebral   cortical infarct is seen.  No intracranial hemorrhage is found.  No mass   effect is found in the brain.    The ventricles, sulci and basal cisterns show marked frontal and temporal   lobe atrophy.    The orbits are unremarkable.  The paranasal sinuses are clear.  The nasal   cavity appears intact.  The nasopharynx is symmetric.  The central skull   base, petrous temporal bones and calvarium remain intact.      IMPRESSION:   No acute abnormality.    Frontal and temporal lobe atrophy.          Review of Systems : per HPI         Vital Signs Last 24 Hrs  T(C): 36.8 (28 Mar 2024 06:30), Max: 36.8 (27 Mar 2024 21:37)  T(F): 98.3 (28 Mar 2024 06:30), Max: 98.3 (28 Mar 2024 06:30)  HR: 94 (28 Mar 2024 06:30) (69 - 106)  BP: 101/70 (28 Mar 2024 06:30) (93/61 - 122/86)  BP(mean): 72 (27 Mar 2024 23:10) (72 - 72)  RR: 17 (28 Mar 2024 06:30) (16 - 20)  SpO2: 94% (28 Mar 2024 06:30) (94% - 98%)    Parameters below as of 28 Mar 2024 06:30  Patient On (Oxygen Delivery Method): room air            Physical Exam: 1:1, 38 y o woman lying comfortably in semi Mayen's position , awake , alert , no acute complaints     Head: normocephalic , atraumatic    Eyes: PERRLA , EOMI , no nystagmus , sclera anicteric    ENT / FACE: neg nasal discharge , uvula midline , no oropharyngeal erythema / exudate    Neck: supple , negative JVD , negative carotid bruits , no thyromegaly    Chest: CTA bilaterally     Cardiovascular: regular rate and rhythm , neg murmurs / rubs / gallops    Abdomen: soft , non distended , non tender to palpation in all 4 quadrants ,  normal bowel sounds     Extremities: WWP , neg cyanosis /clubbing / edema       Neurologic Exam:     Alert and oriented to person , place , date/year , speech fluent w/o dysarthria     Cranial Nerves:           II:                         pupils equal , round and reactive to light , visual fields intact         III/ IV/VI:             extraocular movements intact , neg nystagmus , neg ptosis        V:                        facial sensation intact , V1-3 normal        VII:                      face symmetric , no droop , normal eye closure and smile        VIII:                     hearing intact to finger rub bilaterally        IX and X:             no hoarseness , gag intact , palate/ uvula rise symmetrically        XI:                       SCM / trapezius strength intact bilateral        XII:                      no tongue deviation       Motor Exam:        > 4/5 x 4 extremities , without drift       Sensation:         intact to light touch x 4 extremities     DTR:           biceps/brachioradialis: equal                            patella/ankle: equal            neg Babinski    Coordination:            Finger to Nose:  neg dysmetria bilaterally           Heel to Shin:    neg dysmetria bilaterally          Gait:  not tested           PM&R Impression: admitted for AMS/psychosis    - no acute pathology on CT brain imaging         Recommendations / Plan:       1) Physical / Occupational therapy focusing on therapeutic exercises , equipment evaluation , bed mobility/transfer out of bed evaluation , progressive ambulation with assistive devices prn .    2) Current disposition plan recommendation:    d/c home      
Patient is a 38y old  Female who presents with a chief complaint of Altered mental status and progressive cognitive impairment (28 Mar 2024 13:03)      HPI:  38-year-old female with a history of alcohol use disorder (sober for 1 year), OCD, depression, anxiety with 2 prior psychiatric hospitalizations in the past year (follows with psychiatrist Dr. Hernandez, last visit decreased fluvoxamine and Abilify 1-2 weeks ago), currently taking Abilify 2.5 mg QHS, Klonopin 0.25 mg BID, fluvoxamine 150 mg QHS and memantine 10 mg BID. Patient BIBEMS to [facility] for altered mental status. Patient brought in from outpatient facility for inability or perform blood work. As per EMS, patient was too agitated to perform blood work. Patient was aaox4 but babbles words repeatedly and is unable to focus or cooperate with staff. Patient brought in for psych eval. Patient unable to give pertinent psych history.     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 In March and April 2023 patient was admitted for 1 month in 2 different mental health clinics. Although patient has been sober for a year her functional status continues to decline, with a 35 pound weight gain in the past year, poor hygiene, and sleeping most of the day every day. Her  describes how patient becomes more childish, changing her voice and behaving inappropriately. Posteriorly, during the nexts months declining gradually in cognition, activity, memory, personality and calculation, and being more apathetic. In January 2024, patient got worse in a stepwise manner and started to walk worse and fall more often. Patient has been unable to work for at least 2 years.  She is currently under the care of a psychotherapist who recommended that she report to a health care facility for further evaluation and likely admission as patient has been unresponsive to outpatient therapy and medications.  Per  patient has not been coughing or indicating pain of any kind.  No recent illness.     Stepwise cognitive decline over the course of several years, more prominent over the past few weeks, unresponsive to pharmacotherapy/psychiatric intervention in outpt setting, Psych following at [facility].    In [facility] ED:  - Vitals: Normal  - Labs: Normal  - UA: cloudy urine  - Utox: + BZD  - CT head wo: frontotemporal atrophy not characteristic of age.     Transferred to [facility] after discussion with neurology attending Dr. Mccrary for further management.  (27 Mar 2024 22:33)    Patient seen and examined at bedside,  present.  Patient largely answers Y/N questions only does not elaborate details unless prodded with questioning.  Alert to self, hospital with choice, said it was July.  Describes her mood as "good".  Denies fever, chills, HA, SOB, CP, N/V, abdominal pain.  No recent illness since January COVID episode.  Steady decline in functional status per       REVIEW OF SYSTEMS: see HPI    PAST MEDICAL & SURGICAL HISTORY:  OCD (obsessive compulsive disorder)      Mixed anxiety and depressive disorder      History of alcohol use disorder      No significant past surgical history          FAMILY HISTORY:  Mental illness unspecified in Father    SOCIAL HISTORY:  Tobacco use: None  EtOH use: Sober > 1 yr, prior alcoholism  Recreational drug use: None    MEDICATIONS:  MEDICATIONS  (STANDING):  ARIPiprazole 2.5 milliGRAM(s) Oral at bedtime  clonazePAM Oral Disintegrating Tablet 0.25 milliGRAM(s) Oral two times a day  fluvoxaMINE 150 milliGRAM(s) Oral at bedtime  influenza   Vaccine 0.5 milliLiter(s) IntraMuscular once  LORazepam   Injectable 1 milliGRAM(s) IV Push once  memantine 10 milliGRAM(s) Oral two times a day    MEDICATIONS  (PRN):  OLANZapine Disintegrating Tablet 5 milliGRAM(s) Oral daily PRN Severe agitation      ALLERGIES:  Allergies    No Known Allergies    Intolerances        VITAL SIGNS:  Vital Signs Last 24 Hrs  T(C): 36.5 (28 Mar 2024 11:53), Max: 36.8 (27 Mar 2024 21:37)  T(F): 97.7 (28 Mar 2024 11:53), Max: 98.3 (28 Mar 2024 06:30)  HR: 85 (28 Mar 2024 11:53) (69 - 94)  BP: 95/67 (28 Mar 2024 11:53) (93/61 - 113/77)  BP(mean): 72 (27 Mar 2024 23:10) (72 - 72)  RR: 18 (28 Mar 2024 11:53) (16 - 18)  SpO2: 96% (28 Mar 2024 11:53) (94% - 97%)    Parameters below as of 28 Mar 2024 11:53  Patient On (Oxygen Delivery Method): room air        PHYSICAL EXAM:  Constitutional: NAD, comfortable in bed.  HEENT: NC/AT, PERRLA, EOMI, MMM  Neck: Supple  Respiratory: CTA B/L. No w/r/r.   Cardiovascular: RRR, normal S1 and S2, no m/r/g.   Gastrointestinal: +BS, soft NTND   Extremities: wwp; no cyanosis, clubbing or edema.   Vascular: Pulses equal and strong throughout.   Neurological: AAOx2, flat affect, answers in short answers, moves all extremities spontaneously   Skin: no rashes        LABS:                        14.6   7.00  )-----------( 278      ( 28 Mar 2024 05:30 )             42.3     03-28    142  |  106  |  13  ----------------------------<  94  3.9   |  27  |  0.79    Ca    10.0      28 Mar 2024 05:30    TPro  6.5  /  Alb  4.0  /  TBili  0.7  /  DBili  x   /  AST  25  /  ALT  27  /  AlkPhos  79  03-28      Urinalysis Basic - ( 28 Mar 2024 05:30 )    Color: x / Appearance: x / SG: x / pH: x  Gluc: 94 mg/dL / Ketone: x  / Bili: x / Urobili: x   Blood: x / Protein: x / Nitrite: x   Leuk Esterase: x / RBC: x / WBC x   Sq Epi: x / Non Sq Epi: x / Bacteria: x          CAPILLARY BLOOD GLUCOSE              RADIOLOGY & ADDITIONAL TESTS: Reviewed.  < from: CT Head No Cont (03.27.24 @ 18:01) >    IMPRESSION:   No acute abnormality.    Frontal and temporal lobe atrophy.    < end of copied text >

## 2024-03-28 NOTE — OCCUPATIONAL THERAPY INITIAL EVALUATION ADULT - PERTINENT HX OF CURRENT PROBLEM, REHAB EVAL
38-year-old female with a history of alcohol use disorder (sober for 1 year), OCD, depression, anxiety with 2 prior psychiatric hospitalizations in the past year, currently taking Abilify 5 mg, Klonopin 0.25 mg, fluvoxamine 150 mg and memantine 10 mg. Patient BIBEMS to [facility] for altered mental status. Patient brought in from outpatient facility for inability or perform blood work. As per EMS, patient was too agitated to perform blood work. Stepwise cognitive decline over the course of several years, more prominent over the past few weeks, unresponsive to pharmacotherapy/psychiatric intervention in outpt setting, CT head wo shows frontotemporal atrophy not characteristic of age. Psych following at [facility]. Transferred to [facility] after discussion with neurology attending Dr. Mccrary for further management.

## 2024-03-28 NOTE — OCCUPATIONAL THERAPY INITIAL EVALUATION ADULT - GENERAL OBSERVATIONS, REHAB EVAL
Pt received semi-supine in bed, +vEEG, +heplock, +1:1, pt restless but re-directable,  at bedside. Cleared by CHRISTOPHER Turpin

## 2024-03-28 NOTE — OCCUPATIONAL THERAPY INITIAL EVALUATION ADULT - ADDITIONAL COMMENTS
History obtained from  at bedside, as pt with decreased attention and perseverating on wanting to eat "candy cane" [place] kisses. Pt lives with her  in an apartment with elevator access on the 6th floor (patient was able to state apartment but when asked what floor pt started to count). Prior to admit, pt was having a decline in ADL performance,  was assisting with bathing and dressing as needed, has a tub. Pt is R hand dominant and wears glasses. Pt was independent with mobility, did not require any AD

## 2024-03-28 NOTE — PHYSICAL THERAPY INITIAL EVALUATION ADULT - IMPAIRMENTS FOUND, PT EVAL
aerobic capacity/endurance/arousal, attention, and cognition/cognitive impairment/gait, locomotion, and balance/poor safety awareness/posture

## 2024-03-28 NOTE — OCCUPATIONAL THERAPY INITIAL EVALUATION ADULT - MODALITIES TREATMENT COMMENTS
Patient scored a 11/30 on the MOCA indicating severe cognitive impairment. Pt demonstrating deficits in visuospatial/executive functioning, memory, attention, language, and abstract thinking.

## 2024-03-28 NOTE — PHYSICAL THERAPY INITIAL EVALUATION ADULT - ADDITIONAL COMMENTS
Pt is a poor historian,  is at bedside and able to report PLOF. Pt lives with  in an elevator access apartment with no steps to enter. As per , there has been a recent decline in the Pt's ability to perform ADLs, so  has been assisting. Pt was able to ambulate in community without AD and recently has bee needing assist from .

## 2024-03-28 NOTE — CONSULT NOTE ADULT - REASON FOR ADMISSION
Altered mental status and progressive cognitive impairment
Altered mental status and progressive cognitive impairment

## 2024-03-28 NOTE — BH CONSULTATION LIAISON PROGRESS NOTE - OTHER
"I'm good" Pt was lethargic and drowsy during the consult, noting that she wanted to "sleep" and asked providers to direct questions to her . She responded with one-word responses and had her eyes closed for the majority of the consult.  pt's eyes were closed for the majority of the encounter.

## 2024-03-28 NOTE — BH CONSULTATION LIAISON PROGRESS NOTE - NSBHASSESSMENTFT_PSY_ALL_CORE
ASSESSMENT:        On today's eval, pt appearing drowsy, unwilling/unable to answer most questions. But found to be disoriented to time, psychomotor slowed, slowed speech, indicating evidence of cognitive impairment.  Collateral from  confirms rapid development of OCD and then alcohol use without any prior psychiatric history before 2020.   Imaging (CT head) showing frontal and temporal atrophy.  The above indicates likely a neurocognitive disorder, with psychiatric symptoms from an organic cause.      DIFFERENTIAL:  NEUROCOGNITIVE  alcohol  OCD *due to another medical condition    PLAN:  -   - continue home meds   - agitation: zyprexa 2.5mg PRN ASSESSMENT: 38-year-old female with a history of alcohol use disorder (sober for 1 year), OCD, depression, anxiety with 2 prior partial hospitalizations ([facility] and [facility]) in the past year (follows with psychiatrist Dr. Figueroa, last visit decreased fluvoxamine and Abilify 1-2 weeks ago), currently taking Abilify 2.5 mg QHS, Klonopin 0.25 mg BID, fluvoxamine 150 mg QHS and memantine 10 mg BID. Patient BIBEMS to [facility] for altered mental status. Patient brought in from outpatient facility for inability or perform blood work due to agitation.    On today's eval, pt appearing drowsy, unwilling/unable to answer most questions. Found to be disoriented to time, psychomotor slowed, slowed speech, indicating evidence of cognitive impairment.  Collateral from  confirms rapid development of OCD around [id number] and then heavy alcohol use around 2021 without any prior psychiatric history. Cognitive decline has occurred since 2022, with more rapid development in the last 6 months.  Imaging (CT head) showing frontal and temporal atrophy.  The above indicates likely a neurocognitive disorder, with psychiatric symptoms stemming from an organic cause.      DIFFERENTIAL:  Neurocognitive disorder  Alcohol use disorder, in remission  OCD *due to another medical condition  R/o MDD due to another medical condition, r/o catatonic features.    PLAN:  - draw if not already done: TSH, b12, folate, HIV, syphilis, ceruloplasmin, iron studies.  - agree with further neuro workup incl MRI brain, vEEG, LP  - agree with further workup for Silvana's and other genetic syndromes  - Pending collateral from psychiatrist; awaiting call back. (number for psychiatrist and  above in HPI)  - Continue current meds: fluvoxamine 150mg daily, abilify 2.5mg daily, klonopin 0.25mg BID (but hold for sedation; if pt persistently drowsy would make klonopin PRN)  - For agitation not responsive to verbal redirection or other gentle measures, can utilize zyprexa 2.5mg PRN; check EKG for Qtc   - Do not suspect a psychiatric hospitalization is indicated, given that current state is most likely due to an underlying neurological illness and this underlying condition would not be responsive to psychopharmacologic intervention (though specific symptoms can still be treated as needed, including agitation or abulia).  - Psychiatry will continue to follow

## 2024-03-29 LAB
CERULOPLASMIN SERPL-MCNC: 25 MG/DL — SIGNIFICANT CHANGE UP (ref 16–45)
GLUCOSE BLDC GLUCOMTR-MCNC: 105 MG/DL — HIGH (ref 70–99)
IRON SATN MFR SERPL: 106 UG/DL — SIGNIFICANT CHANGE UP (ref 30–160)
IRON SATN MFR SERPL: 45 % — SIGNIFICANT CHANGE UP (ref 14–50)
LDH CSF L TO P-CCNC: 16 U/L — SIGNIFICANT CHANGE UP
LDH FLD-CCNC: 16 U/L — SIGNIFICANT CHANGE UP
T PALLIDUM AB TITR SER: NEGATIVE — SIGNIFICANT CHANGE UP
TIBC SERPL-MCNC: 233 UG/DL — SIGNIFICANT CHANGE UP (ref 220–430)
UIBC SERPL-MCNC: 127 UG/DL — SIGNIFICANT CHANGE UP (ref 110–370)

## 2024-03-29 PROCEDURE — 99232 SBSQ HOSP IP/OBS MODERATE 35: CPT

## 2024-03-29 PROCEDURE — 99233 SBSQ HOSP IP/OBS HIGH 50: CPT

## 2024-03-29 PROCEDURE — 95720 EEG PHY/QHP EA INCR W/VEEG: CPT

## 2024-03-29 PROCEDURE — 78608 BRAIN IMAGING (PET): CPT | Mod: 26

## 2024-03-29 RX ADMIN — FLUVOXAMINE MALEATE 150 MILLIGRAM(S): 25 TABLET ORAL at 22:16

## 2024-03-29 RX ADMIN — MEMANTINE HYDROCHLORIDE 10 MILLIGRAM(S): 10 TABLET ORAL at 18:10

## 2024-03-29 RX ADMIN — ENOXAPARIN SODIUM 40 MILLIGRAM(S): 100 INJECTION SUBCUTANEOUS at 22:17

## 2024-03-29 RX ADMIN — ARIPIPRAZOLE 2.5 MILLIGRAM(S): 15 TABLET ORAL at 22:17

## 2024-03-29 RX ADMIN — MEMANTINE HYDROCHLORIDE 10 MILLIGRAM(S): 10 TABLET ORAL at 06:58

## 2024-03-29 RX ADMIN — Medication 2 MILLIGRAM(S): at 09:15

## 2024-03-29 RX ADMIN — Medication 0.25 MILLIGRAM(S): at 18:10

## 2024-03-29 RX ADMIN — Medication 0.25 MILLIGRAM(S): at 06:58

## 2024-03-29 NOTE — PROGRESS NOTE ADULT - ASSESSMENT
38-year-old female with a history of alcohol use disorder (sober for 1 year), OCD, depression, anxiety with 2 prior psychiatric hospitalizations in the past year, currently taking Abilify 5 mg, Klonopin 0.25 mg, fluvoxamine 150 mg and memantine 10 mg. Patient BIBEMS to [facility] for altered mental status. Patient brought in from outpatient facility for inability or perform blood work. As per EMS, patient was too agitated to perform blood work. Stepwise cognitive decline over the course of several years, more prominent over the past few weeks, unresponsive to pharmacotherapy/psychiatric intervention in outpt setting, CT head wo shows frontotemporal atrophy not characteristic of age concerning for potential frontotemporal dementia. Alternative diagnoses include [place]'s disease, prion disease (eg. Creutzfelt-Gilson disease), or autoimmune encephalitis. Psych following at [facility]. Transferred to [facility] after discussion with neurology attending Dr. Mccrary for further management.        Problem/Plan - 1:  ·  Problem: Progressive cognitive impairment.   PLAN:  - CT Head on 3/26 shows atrophy of the anterior frontal and temporal lobes   - Psych f/o with patient on 3/28  · On home med: continue memantine 10 mg BID  - Genetic testing - frontotemporal dementia panel and HTT gene  - Lumbar puncture for evaluation of possible prion disease (eg. 14-3-3, RT-QuIC, tau), autoimmune process  - PET scan   - MRI w/ contrast     Problem/Plan - 2:  ·  Problem: Psychosis, unspecified psychosis type.   PLAN:  - If severe agitation, olanzapine 5 mg PRN  - Order vEEG     Problem/Plan - 3:  ·  Problem: OCD (obsessive compulsive disorder).   ·  Plan: On home meds: Abilify 2.5 mg QHS.     Problem/Plan - 4:  ·  Problem: Mixed anxiety and depressive disorder.   ·  Plan: On home meds: Fluvoxamine 150 mg QHS and Klonopin 0.25 mg BID.     Problem/Plan - 5:  ·  Problem: History of alcohol use disorder.   ·  Plan: No active. Possible cognitive impairment related.     Problem/Plan - 6:  ·  Problem: Preventive measure.   ·  Plan: Regular diet  Replete electrolytes  DVT prophylaxis  Full code  Dispo: [facility]. 38-year-old female with a history of alcohol use disorder (sober for 1 year), OCD, depression, anxiety with 2 prior psychiatric hospitalizations in the past year, currently taking Abilify 5 mg, Klonopin 0.25 mg, fluvoxamine 150 mg and memantine 10 mg. Patient BIBEMS to [facility] for altered mental status. Patient brought in from outpatient facility for inability or perform blood work. As per EMS, patient was too agitated to perform blood work. Stepwise cognitive decline over the course of several years, more prominent over the past few weeks, unresponsive to pharmacotherapy/psychiatric intervention in outpt setting, CT head wo shows frontotemporal atrophy not characteristic of age concerning for potential frontotemporal dementia. Alternative diagnoses include [place]'s disease, prion disease (eg. Creutzfelt-Gilson disease), or autoimmune encephalitis. Psych following at [facility]. Transferred to [facility] after discussion with neurology attending Dr. Mccrary for further management.        Problem/Plan - 1:  ·  Problem: Progressive cognitive impairment.   PLAN:  - CT Head on 3/26 shows atrophy of the anterior frontal and temporal lobes   - Psych f/o with patient on 3/28  - PET imaging done on 3/29  - MRI w/contrast done on 3/29   · On home med: continue memantine 10 mg BID  - Genetic testing - frontotemporal dementia panel and HTT gene  - Lumbar puncture for evaluation of possible prion disease (eg. 14-3-3, RT-QuIC, tau), autoimmune process     Problem/Plan - 2:  ·  Problem: Psychosis, unspecified psychosis type.   PLAN:  - If severe agitation, olanzapine 5 mg PRN  - Currently on vEEG     Problem/Plan - 3:  ·  Problem: OCD (obsessive compulsive disorder).   ·  Plan: On home meds: Abilify 2.5 mg QHS.     Problem/Plan - 4:  ·  Problem: Mixed anxiety and depressive disorder.   ·  Plan: On home meds: Fluvoxamine 150 mg QHS and Klonopin 0.25 mg BID.     Problem/Plan - 5:  ·  Problem: History of alcohol use disorder.   ·  Plan: No active. Possible cognitive impairment related.     Problem/Plan - 6:  ·  Problem: Preventive measure.   ·  Plan: Regular diet  Replete electrolytes  DVT prophylaxis  Full code  Dispo: [facility]. 38-year-old female with a history of alcohol use disorder (sober for 1 year), OCD, depression, anxiety with 2 prior psychiatric hospitalizations in the past year, currently taking Abilify 5 mg, Klonopin 0.25 mg, fluvoxamine 150 mg and memantine 10 mg. Patient BIBEMS to [facility] for altered mental status. Patient brought in from outpatient facility for inability or perform blood work. As per EMS, patient was too agitated to perform blood work. Stepwise cognitive decline over the course of several years, more prominent over the past few weeks, unresponsive to pharmacotherapy/psychiatric intervention in outpt setting, CT head wo shows frontotemporal atrophy not characteristic of age concerning for potential frontotemporal dementia. Alternative diagnoses include [place]'s disease, prion disease (eg. Creutzfelt-Gilson disease), or autoimmune encephalitis. Psych following at [facility]. Transferred to [facility] after discussion with neurology attending Dr. Mccrary for further management.        Problem/Plan - 1:  ·  Problem: Progressive cognitive impairment.   PLAN:  - CT Head on 3/26 shows atrophy of the anterior frontal and temporal lobes   - Psych f/o with patient on 3/28  - PET imaging done on 3/29, pending read  - MRI w/contrast pending  · On home med: continue memantine 10 mg BID  - Genetic testing - frontotemporal dementia panel and HTT gene today  - Lumbar puncture done on 3/28. Neg results so far, pending further results     Problem/Plan - 2:  ·  Problem: Psychosis, unspecified psychosis type.   PLAN:  - If severe agitation, olanzapine 5 mg PRN  - vEEG neg, dc veeg     Problem/Plan - 3:  ·  Problem: OCD (obsessive compulsive disorder).   ·  Plan: On home meds: Abilify 2.5 mg QHS.     Problem/Plan - 4:  ·  Problem: Mixed anxiety and depressive disorder.   ·  Plan: On home meds: Fluvoxamine 150 mg QHS and Klonopin 0.25 mg BID.     Problem/Plan - 5:  ·  Problem: History of alcohol use disorder.   ·  Plan: No active. Possible cognitive impairment related.     Problem/Plan - 6:  ·  Problem: Preventive measure.   ·  Plan: Regular diet  Replete electrolytes  DVT prophylaxis  Full code  Dispo: [facility] 38-year-old female with a history of alcohol use disorder (sober for 1 year), OCD, depression, anxiety with 2 prior psychiatric hospitalizations in the past year, currently taking Abilify 5 mg, Klonopin 0.25 mg, fluvoxamine 150 mg and memantine 10 mg. Patient BIBEMS to [facility] for altered mental status. Patient brought in from outpatient facility for inability or perform blood work. As per EMS, patient was too agitated to perform blood work. Stepwise cognitive decline over the course of several years, more prominent over the past few weeks, unresponsive to pharmacotherapy/psychiatric intervention in outpt setting, CT head wo shows frontotemporal atrophy not characteristic of age concerning for potential frontotemporal dementia. Alternative diagnoses include [place]'s disease, prion disease (eg. Creutzfelt-Gilson disease), or autoimmune encephalitis. Transferred to [facility] after discussion with neurology attending Dr. Mccrary for further management.        Problem/Plan - 1:  ·  Problem: Progressive cognitive impairment.   PLAN:  - CT Head on 3/26 shows atrophy of the anterior frontal and temporal lobes   - Psych f/o with patient on 3/28  - PET imaging done on 3/29, pending read  - MRI w/contrast pending  · On home med: continue memantine 10 mg BID  - Genetic testing - frontotemporal dementia panel and HTT gene today  - Lumbar puncture done on 3/28. Neg results so far, pending further results  - Send ceruloplasmin and porphyria workup     Problem/Plan - 2:  ·  Problem: Psychosis, unspecified psychosis type.   PLAN:  - If severe agitation, olanzapine 5 mg PRN  - vEEG neg, dc veeg     Problem/Plan - 3:  ·  Problem: OCD (obsessive compulsive disorder).   ·  Plan: On home meds: Abilify 2.5 mg QHS.     Problem/Plan - 4:  ·  Problem: Mixed anxiety and depressive disorder.   ·  Plan: On home meds: Fluvoxamine 150 mg QHS and Klonopin 0.25 mg BID.     Problem/Plan - 5:  ·  Problem: History of alcohol use disorder.   ·  Plan: No active. Possible cognitive impairment related.     Problem/Plan - 6:  ·  Problem: Preventive measure.   ·  Plan: Regular diet  Replete electrolytes  DVT prophylaxis  Full code  Dispo: [facility]

## 2024-03-29 NOTE — EEG REPORT - NS EEG TEXT BOX
[facility] Department of Neurology  Inpatient Continuous video-Electroencephalogram  [address], [address] [place] [id number], T: [phone]    Patient Name:	ALEX QUACH    :	1985  MRN:	[id number]    Study Start Date/Time:	3/28/2024, 1:20:39 AM  Study End Date/Time:    Referred by:  John Mccrary MD    Brief Clinical History:  ALEX QUACH is a 38 year old Female; study performed to investigate for seizures or markers of epilepsy.   Technologist notes: -  Diagnosis Code:  R56.9 convulsions/seizure    Pertinent Medication:  n/a    Acquisition Details:  Electroencephalography was acquired using a minimum of 21 channels on an [employer] system v 9.3.1 with electrode placement according to the standard International 10-20 system following ACNS (American Clinical Neurophysiology Society) guidelines.  Anterior temporal T1 and T2 electrodes were utilized whenever possible.  The [employer] automated spike & seizure detections were all reviewed in detail, in addition to the entire raw EEG.    Findings:  Day 1:  3/28/2024, 1:20:39 AM to next morning at 07:00 AM   Background:  continuous, with predominantly alpha and beta frequencies.  Generalized Slowing:  None  Symmetry/Focality: No persistent asymmetries of voltage or frequency.  Occasional delta frequencies in posterior regions suspected to be 2/2 to artifact.     Voltage:  Normal (20+ uV)  Organization:  Appropriate anterior-posterior gradient  Posterior Dominant Rhythm:  9 Hz symmetric, well-organized, and well-modulated  Sleep:  Symmetric, synchronous spindles and K complexes.  Variability:   Yes		Reactivity:  Yes    Spontaneous Activity:  No epileptiform discharges     Events:  1)	No electrographic seizures. Multiple episodes of right arm shaking were not associated with epileptiform abnormalities   Provocations:  •	Hyperventilation: was not performed.  •	Photic stimulation: was not performed.  Daily Summary:    1)	Episodes of right arm shaking were not associated with epileptiform abnormalities        Marbella Forte MD  Attending Neurologist, [facility] Epilepsy Program

## 2024-03-29 NOTE — PROGRESS NOTE ADULT - ATTENDING COMMENTS
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

## 2024-03-29 NOTE — PROGRESS NOTE ADULT - SUBJECTIVE AND OBJECTIVE BOX
Neurology Progress Note    Interval History:    Patient seen and examined today. No events overnight.     HPI:  38-year-old female with a history of alcohol use disorder (sober for 1 year), OCD, depression, anxiety with 2 prior psychiatric hospitalizations in the past year (follows with psychiatrist Dr. Hernandez, last visit decreased fluvoxamine and Abilify 1-2 weeks ago), currently taking Abilify 2.5 mg QHS, Klonopin 0.25 mg BID, fluvoxamine 150 mg QHS and memantine 10 mg BID. Patient BIBEMS to [facility] for altered mental status. Patient brought in from outpatient facility for inability or perform blood work. As per EMS, patient was too agitated to perform blood work. Patient was aaox4 but babbles words repeatedly and is unable to focus or cooperate with staff. Patient brought in for psych eval. Patient unable to give pertinent psych history.     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 In March and April 2023 patient was admitted for 1 month in 2 different mental health clinics. Although patient has been sober for a year her functional status continues to decline, with a 35 pound weight gain in the past year, poor hygiene, and sleeping most of the day every day. Her  describes how patient becomes more childish, changing her voice and behaving inappropriately. Posteriorly, during the nexts months declining gradually in cognition, activity, memory, personality and calculation, and being more apathetic. In January 2024, patient got worse in a stepwise manner and started to walk worse and fall more often. Patient has been unable to work for at least 2 years.  She is currently under the care of a psychotherapist who recommended that she report to a health care facility for further evaluation and likely admission as patient has been unresponsive to outpatient therapy and medications.  Per  patient has not been coughing or indicating pain of any kind.  No recent illness.     Stepwise cognitive decline over the course of several years, more prominent over the past few weeks, unresponsive to pharmacotherapy/psychiatric intervention in outpt setting, Psych following at [facility].    In [facility] ED:  - Vitals: Normal  - Labs: Normal  - UA: cloudy urine  - Utox: + BZD  - CT head wo: frontotemporal atrophy not characteristic of age.     Transferred to [facility] after discussion with neurology attending Dr. Mccrary for further management.  (27 Mar 2024 22:33)      PAST MEDICAL & SURGICAL HISTORY:  OCD (obsessive compulsive disorder)    Mixed anxiety and depressive disorder    History of alcohol use disorder    No significant past surgical history            Medications:  ARIPiprazole 2.5 milliGRAM(s) Oral at bedtime  clonazePAM Oral Disintegrating Tablet 0.25 milliGRAM(s) Oral two times a day  enoxaparin Injectable 40 milliGRAM(s) SubCutaneous every 24 hours  fluvoxaMINE 150 milliGRAM(s) Oral at bedtime  influenza   Vaccine 0.5 milliLiter(s) IntraMuscular once  memantine 10 milliGRAM(s) Oral two times a day  OLANZapine Disintegrating Tablet 5 milliGRAM(s) Oral daily PRN      Vital Signs Last 24 Hrs  T(C): 36.3 (29 Mar 2024 06:37), Max: 36.7 (28 Mar 2024 21:20)  T(F): 97.4 (29 Mar 2024 06:37), Max: 98.1 (28 Mar 2024 21:20)  HR: 86 (29 Mar 2024 06:37) (85 - 99)  BP: 95/66 (29 Mar 2024 06:37) (95/66 - 121/78)  BP(mean): 92 (28 Mar 2024 21:20) (92 - 92)  RR: 18 (29 Mar 2024 06:37) (17 - 18)  SpO2: 95% (29 Mar 2024 06:37) (95% - 97%)    Parameters below as of 29 Mar 2024 06:37  Patient On (Oxygen Delivery Method): room air          Neurological Exam:   Mental status: She is awake and alert but is oriented only to self and not location or time (AAO x1). Recent and remote memory intact.  Naming, repetition and comprehension intact.  impairment in attention as there is difficulty following two-step commands.  No dysarthria, no aphasia.  She does not know who the current president is.   Cranial nerves: Pupils equally round and reactive to light, no nystagmus, extraocular muscles intact, V1 through V3 intact bilaterally and symmetric, face symmetric, hearing intact to finger rub. Shoulder strength intact. Unable to cooperate with exam for evaluating visual fields, jaw closing (motor V), palate elevation, or tongue movement.  Motor:  Strength is 5/5 bilaterally with wrist flexion/extension, forearm flexion/extension, leg flexion/extension, dorsiflexion/plantarflexion.   strength 5/5.  Normal tone and bulk.  No abnormal movements.    Sensation: Intact to light touch in the upper and lower extremities bilaterally.   Coordination: No dysmetria on finger-to-nose and able to perform rapid alternating movements with repeated pronation/supination (No dysdiadokinesia).  Reflexes: 2+ in bilateral UE/LE, downgoing toes bilaterally. (-) Corbin.  Gait: Narrow and steady. No ataxia.  Romberg negative    Labs:  CBC Full  -  ( 28 Mar 2024 05:30 )  WBC Count : 7.00 K/uL  RBC Count : 4.56 M/uL  Hemoglobin : 14.6 g/dL  Hematocrit : 42.3 %  Platelet Count - Automated : 278 K/uL  Mean Cell Volume : 92.8 fl  Mean Cell Hemoglobin : 32.0 pg  Mean Cell Hemoglobin Concentration : 34.5 gm/dL  Auto Neutrophil # : x  Auto Lymphocyte # : x  Auto Monocyte # : x  Auto Eosinophil # : x  Auto Basophil # : x  Auto Neutrophil % : x  Auto Lymphocyte % : x  Auto Monocyte % : x  Auto Eosinophil % : x  Auto Basophil % : x    03-28    142  |  106  |  13  ----------------------------<  94  3.9   |  27  |  0.79    Ca    10.0      28 Mar 2024 05:30    TPro  6.5  /  Alb  4.0  /  TBili  0.7  /  DBili  x   /  AST  25  /  ALT  27  /  AlkPhos  79  03-28    LIVER FUNCTIONS - ( 28 Mar 2024 05:30 )  Alb: 4.0 g/dL / Pro: 6.5 g/dL / ALK PHOS: 79 U/L / ALT: 27 U/L / AST: 25 U/L / GGT: x           PT/INR - ( 28 Mar 2024 16:58 )   PT: 10.9 sec;   INR: 0.95          PTT - ( 28 Mar 2024 16:58 )  PTT:33.5 sec  Urinalysis Basic - ( 28 Mar 2024 05:30 )    Color: x / Appearance: x / SG: x / pH: x  Gluc: 94 mg/dL / Ketone: x  / Bili: x / Urobili: x   Blood: x / Protein: x / Nitrite: x   Leuk Esterase: x / RBC: x / WBC x   Sq Epi: x / Non Sq Epi: x / Bacteria: x        Radiology: Reviewed Neurology Progress Note    Interval History:    Patient seen and examined today. No events overnight. She says she is doing good. Denies any headache, lightheadedness, numbness, tingling, weakness, or abdominal pain.     HPI:  38-year-old female with a history of alcohol use disorder (sober for 1 year), OCD, depression, anxiety with 2 prior psychiatric hospitalizations in the past year (follows with psychiatrist Dr. Hernandez, last visit decreased fluvoxamine and Abilify 1-2 weeks ago), currently taking Abilify 2.5 mg QHS, Klonopin 0.25 mg BID, fluvoxamine 150 mg QHS and memantine 10 mg BID. Patient BIBEMS to [facility] for altered mental status. Patient brought in from outpatient facility for inability or perform blood work. As per EMS, patient was too agitated to perform blood work. Patient was aaox4 but babbles words repeatedly and is unable to focus or cooperate with staff. Patient brought in for psych eval. Patient unable to give pertinent psych history.     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 In March and April 2023 patient was admitted for 1 month in 2 different mental health clinics. Although patient has been sober for a year her functional status continues to decline, with a 35 pound weight gain in the past year, poor hygiene, and sleeping most of the day every day. Her  describes how patient becomes more childish, changing her voice and behaving inappropriately. Posteriorly, during the nexts months declining gradually in cognition, activity, memory, personality and calculation, and being more apathetic. In January 2024, patient got worse in a stepwise manner and started to walk worse and fall more often. Patient has been unable to work for at least 2 years.  She is currently under the care of a psychotherapist who recommended that she report to a health care facility for further evaluation and likely admission as patient has been unresponsive to outpatient therapy and medications.  Per  patient has not been coughing or indicating pain of any kind.  No recent illness.     Stepwise cognitive decline over the course of several years, more prominent over the past few weeks, unresponsive to pharmacotherapy/psychiatric intervention in outpt setting, Psych following at [facility].    In [facility] ED:  - Vitals: Normal  - Labs: Normal  - UA: cloudy urine  - Utox: + BZD  - CT head wo: frontotemporal atrophy not characteristic of age.     Transferred to [facility] after discussion with neurology attending Dr. Mccrary for further management.  (27 Mar 2024 22:33)      PAST MEDICAL & SURGICAL HISTORY:  OCD (obsessive compulsive disorder)    Mixed anxiety and depressive disorder    History of alcohol use disorder    No significant past surgical history            Medications:  ARIPiprazole 2.5 milliGRAM(s) Oral at bedtime  clonazePAM Oral Disintegrating Tablet 0.25 milliGRAM(s) Oral two times a day  enoxaparin Injectable 40 milliGRAM(s) SubCutaneous every 24 hours  fluvoxaMINE 150 milliGRAM(s) Oral at bedtime  influenza   Vaccine 0.5 milliLiter(s) IntraMuscular once  memantine 10 milliGRAM(s) Oral two times a day  OLANZapine Disintegrating Tablet 5 milliGRAM(s) Oral daily PRN      Vital Signs Last 24 Hrs  T(C): 36.3 (29 Mar 2024 06:37), Max: 36.7 (28 Mar 2024 21:20)  T(F): 97.4 (29 Mar 2024 06:37), Max: 98.1 (28 Mar 2024 21:20)  HR: 86 (29 Mar 2024 06:37) (85 - 99)  BP: 95/66 (29 Mar 2024 06:37) (95/66 - 121/78)  BP(mean): 92 (28 Mar 2024 21:20) (92 - 92)  RR: 18 (29 Mar 2024 06:37) (17 - 18)  SpO2: 95% (29 Mar 2024 06:37) (95% - 97%)    Parameters below as of 29 Mar 2024 06:37  Patient On (Oxygen Delivery Method): room air          Neurological Exam:   Mental status: She is awake and alert but is oriented only to self and not location or time (AAO x1). She does not know her current location, the date, or who the president is. Recent memory is intact as she is able to recall a string of words when prompted. She is also able to identify common objects by name (eg. pen). There is impairment in attention as she had difficulty spelling the word "WORLD" backwards as well as comprehension as she had difficulty in following commands involving two or more steps.  No dysarthria, no aphasia.   Cranial nerves: Pupils equally round and reactive to light, no nystagmus, extraocular muscles intact, V1 through V3 intact bilaterally and symmetric, face symmetric, hearing intact to finger rub. Shoulder strength intact. Jaw strength intact. Palate is elevated and symmetric with tongue midline exhibiting normal movements. Unable to cooperate with exam for evaluating visual fields (difficulty following commands).  Motor: Uncooperative with examination of strength and sensation in the upper and lower extremities. Occasionally, she exhibits spasms ("flapping") of her right arm that lasts 10-15 seconds.   Sensation: Unable to assess sensation as uncooperative with this portion of the exam.  Coordination: No dysmetria on finger-to-nose and able to perform rapid alternating movements with repeated pronation/supination (No dysdiadokinesia).  Reflexes: 2+ in bilateral UE/LE, downgoing toes bilaterally. (-) Corbin.  Gait: Narrow and steady. No ataxia.  Romberg negative    Labs:  CBC Full  -  ( 28 Mar 2024 05:30 )  WBC Count : 7.00 K/uL  RBC Count : 4.56 M/uL  Hemoglobin : 14.6 g/dL  Hematocrit : 42.3 %  Platelet Count - Automated : 278 K/uL  Mean Cell Volume : 92.8 fl  Mean Cell Hemoglobin : 32.0 pg  Mean Cell Hemoglobin Concentration : 34.5 gm/dL  Auto Neutrophil # : x  Auto Lymphocyte # : x  Auto Monocyte # : x  Auto Eosinophil # : x  Auto Basophil # : x  Auto Neutrophil % : x  Auto Lymphocyte % : x  Auto Monocyte % : x  Auto Eosinophil % : x  Auto Basophil % : x    03-28    142  |  106  |  13  ----------------------------<  94  3.9   |  27  |  0.79    Ca    10.0      28 Mar 2024 05:30    TPro  6.5  /  Alb  4.0  /  TBili  0.7  /  DBili  x   /  AST  25  /  ALT  27  /  AlkPhos  79  03-28    LIVER FUNCTIONS - ( 28 Mar 2024 05:30 )  Alb: 4.0 g/dL / Pro: 6.5 g/dL / ALK PHOS: 79 U/L / ALT: 27 U/L / AST: 25 U/L / GGT: x           PT/INR - ( 28 Mar 2024 16:58 )   PT: 10.9 sec;   INR: 0.95          PTT - ( 28 Mar 2024 16:58 )  PTT:33.5 sec  Urinalysis Basic - ( 28 Mar 2024 05:30 )    Color: x / Appearance: x / SG: x / pH: x  Gluc: 94 mg/dL / Ketone: x  / Bili: x / Urobili: x   Blood: x / Protein: x / Nitrite: x   Leuk Esterase: x / RBC: x / WBC x   Sq Epi: x / Non Sq Epi: x / Bacteria: x        Radiology: Reviewed Neurology Progress Note    Interval History:    Patient seen and examined today. No events overnight. She says she is doing good. Denies any headache, lightheadedness, numbness, tingling, weakness, or abdominal pain.    Medications:  ARIPiprazole 2.5 milliGRAM(s) Oral at bedtime  clonazePAM Oral Disintegrating Tablet 0.25 milliGRAM(s) Oral two times a day  enoxaparin Injectable 40 milliGRAM(s) SubCutaneous every 24 hours  fluvoxaMINE 150 milliGRAM(s) Oral at bedtime  influenza   Vaccine 0.5 milliLiter(s) IntraMuscular once  memantine 10 milliGRAM(s) Oral two times a day  OLANZapine Disintegrating Tablet 5 milliGRAM(s) Oral daily PRN      Vital Signs Last 24 Hrs  T(C): 36.3 (29 Mar 2024 06:37), Max: 36.7 (28 Mar 2024 21:20)  T(F): 97.4 (29 Mar 2024 06:37), Max: 98.1 (28 Mar 2024 21:20)  HR: 86 (29 Mar 2024 06:37) (85 - 99)  BP: 95/66 (29 Mar 2024 06:37) (95/66 - 121/78)  BP(mean): 92 (28 Mar 2024 21:20) (92 - 92)  RR: 18 (29 Mar 2024 06:37) (17 - 18)  SpO2: 95% (29 Mar 2024 06:37) (95% - 97%)    Parameters below as of 29 Mar 2024 06:37  Patient On (Oxygen Delivery Method): room air          Neurological Exam:   Mental status: She is awake and alert but is oriented only to self and not location or time (AAO x1). She does not know her current location, the date, or who the president is. Recent memory is intact as she is able to recall a string of words when prompted. She is also able to identify common objects by name (eg. pen). There is impairment in attention as she had difficulty spelling the word "WORLD" backwards as well as comprehension as she had difficulty in following commands involving two or more steps.  No dysarthria, no aphasia.   Cranial nerves: Pupils equally round and reactive to light, no nystagmus, extraocular muscles intact, V1 through V3 intact bilaterally and symmetric, face symmetric, hearing intact to finger rub. Shoulder strength intact. Jaw strength intact. Palate is elevated and symmetric with tongue midline exhibiting normal movements. Unable to cooperate with exam for evaluating visual fields (difficulty following commands).  Motor: Uncooperative with examination of strength and sensation in the upper and lower extremities. Occasionally, she exhibits spasms ("flapping") of her right arm that lasts 10-15 seconds.   Sensation: Unable to assess sensation as uncooperative with this portion of the exam.  Coordination: No dysmetria on finger-to-nose and able to perform rapid alternating movements with repeated pronation/supination (No dysdiadochokinesia).  Reflexes: 2+ in bilateral UE/LE, downgoing toes bilaterally. (-) Corbin.  Gait: Narrow and steady. No ataxia.  Romberg negative    Labs:  CBC Full  -  ( 28 Mar 2024 05:30 )  WBC Count : 7.00 K/uL  RBC Count : 4.56 M/uL  Hemoglobin : 14.6 g/dL  Hematocrit : 42.3 %  Platelet Count - Automated : 278 K/uL  Mean Cell Volume : 92.8 fl  Mean Cell Hemoglobin : 32.0 pg  Mean Cell Hemoglobin Concentration : 34.5 gm/dL  Auto Neutrophil # : x  Auto Lymphocyte # : x  Auto Monocyte # : x  Auto Eosinophil # : x  Auto Basophil # : x  Auto Neutrophil % : x  Auto Lymphocyte % : x  Auto Monocyte % : x  Auto Eosinophil % : x  Auto Basophil % : x    03-28    142  |  106  |  13  ----------------------------<  94  3.9   |  27  |  0.79    Ca    10.0      28 Mar 2024 05:30    TPro  6.5  /  Alb  4.0  /  TBili  0.7  /  DBili  x   /  AST  25  /  ALT  27  /  AlkPhos  79  03-28    LIVER FUNCTIONS - ( 28 Mar 2024 05:30 )  Alb: 4.0 g/dL / Pro: 6.5 g/dL / ALK PHOS: 79 U/L / ALT: 27 U/L / AST: 25 U/L / GGT: x           PT/INR - ( 28 Mar 2024 16:58 )   PT: 10.9 sec;   INR: 0.95          PTT - ( 28 Mar 2024 16:58 )  PTT:33.5 sec  Urinalysis Basic - ( 28 Mar 2024 05:30 )    Color: x / Appearance: x / SG: x / pH: x  Gluc: 94 mg/dL / Ketone: x  / Bili: x / Urobili: x   Blood: x / Protein: x / Nitrite: x   Leuk Esterase: x / RBC: x / WBC x   Sq Epi: x / Non Sq Epi: x / Bacteria: x        Radiology: Reviewed

## 2024-03-29 NOTE — PROGRESS NOTE ADULT - SUBJECTIVE AND OBJECTIVE BOX
Patient is a 38y old  woman who presents with a chief complaint of Altered mental status and progressive cognitive impairment (29 Mar 2024 10:35)    INTERVAL EVENTS:  thinks the date is 'July 2021'   thinks that she is in [place]   unagitated at time of interview today     SUBJECTIVE:  Patient was seen and examined at bedside.    Diet, Regular (03-28-24 @ 00:17) [Active]    MEDICATIONS:  MEDICATIONS  (STANDING):  ARIPiprazole 2.5 milliGRAM(s) Oral at bedtime  clonazePAM Oral Disintegrating Tablet 0.25 milliGRAM(s) Oral two times a day  enoxaparin Injectable 40 milliGRAM(s) SubCutaneous every 24 hours  fluvoxaMINE 150 milliGRAM(s) Oral at bedtime  influenza   Vaccine 0.5 milliLiter(s) IntraMuscular once  LORazepam   Injectable 2 milliGRAM(s) IV Push once  memantine 10 milliGRAM(s) Oral two times a day    MEDICATIONS  (PRN):  OLANZapine Disintegrating Tablet 5 milliGRAM(s) Oral daily PRN Severe agitation      Allergies    No Known Allergies    Intolerances        OBJECTIVE:  Vital Signs Last 24 Hrs  T(C): 36.6 (29 Mar 2024 20:32), Max: 36.6 (29 Mar 2024 20:32)  T(F): 97.8 (29 Mar 2024 20:32), Max: 97.8 (29 Mar 2024 20:32)  HR: 97 (29 Mar 2024 20:32) (86 - 97)  BP: 102/70 (29 Mar 2024 20:32) (95/66 - 102/70)  BP(mean): --  RR: 18 (29 Mar 2024 20:32) (17 - 18)  SpO2: 96% (29 Mar 2024 20:32) (95% - 96%)    Parameters below as of 29 Mar 2024 20:32  Patient On (Oxygen Delivery Method): room air      I&O's Summary      PHYSICAL EXAM:  Gen: Reclining in bed at time of exam, appears stated age  HEENT: NCAT, MMM,   Neck: trachea at midline  CV: +S1/S2  Pulm: no increase in work of breathing  Abd: soft, ND  Skin: warm and dry,   Ext: no LE edema  Neuro: Thinks that it is July 2021, and that she is in [place]   Psych: Sleepy at time of interview       LABS:                        14.6   7.00  )-----------( 278      ( 28 Mar 2024 05:30 )             42.3     03-28    142  |  106  |  13  ----------------------------<  94  3.9   |  27  |  0.79    Ca    10.0      28 Mar 2024 05:30    TPro  6.5  /  Alb  4.0  /  TBili  0.7  /  DBili  x   /  AST  25  /  ALT  27  /  AlkPhos  79  03-28    LIVER FUNCTIONS - ( 28 Mar 2024 05:30 )  Alb: 4.0 g/dL / Pro: 6.5 g/dL / ALK PHOS: 79 U/L / ALT: 27 U/L / AST: 25 U/L / GGT: x           PT/INR - ( 28 Mar 2024 16:58 )   PT: 10.9 sec;   INR: 0.95          PTT - ( 28 Mar 2024 16:58 )  PTT:33.5 sec  CAPILLARY BLOOD GLUCOSE      POCT Blood Glucose.: 105 mg/dL (29 Mar 2024 09:01)    Urinalysis Basic - ( 28 Mar 2024 05:30 )    Color: x / Appearance: x / SG: x / pH: x  Gluc: 94 mg/dL / Ketone: x  / Bili: x / Urobili: x   Blood: x / Protein: x / Nitrite: x   Leuk Esterase: x / RBC: x / WBC x   Sq Epi: x / Non Sq Epi: x / Bacteria: x        MICRODATA:    Culture - CSF with Gram Stain (collected 28 Mar 2024 15:51)  Source: .CSF  Gram Stain (28 Mar 2024 16:44):    No organisms seen    No WBC's seen.  Preliminary Report (29 Mar 2024 08:04):    No growth to date    Culture - Fungal, CSF (collected 28 Mar 2024 15:15)  Source: .CSF  Preliminary Report (29 Mar 2024 08:27):    Testing in progress        RADIOLOGY/OTHER STUDIES:

## 2024-03-29 NOTE — PROGRESS NOTE ADULT - SUBJECTIVE AND OBJECTIVE BOX
Physical Medicine and Rehabilitation Progress Note :       Patient is a 38y old  Female who presents with a chief complaint of Altered mental status and progressive cognitive impairment (29 Mar 2024 07:23)      HPI:  38-year-old female with a history of alcohol use disorder (sober for 1 year), OCD, depression, anxiety with 2 prior psychiatric hospitalizations in the past year (follows with psychiatrist Dr. Hernandez, last visit decreased fluvoxamine and Abilify 1-2 weeks ago), currently taking Abilify 2.5 mg QHS, Klonopin 0.25 mg BID, fluvoxamine 150 mg QHS and memantine 10 mg BID. Patient BIBEMS to [facility] for altered mental status. Patient brought in from outpatient facility for inability or perform blood work. As per EMS, patient was too agitated to perform blood work. Patient was aaox4 but babbles words repeatedly and is unable to focus or cooperate with staff. Patient brought in for psych eval. Patient unable to give pertinent psych history.     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 In March and April 2023 patient was admitted for 1 month in 2 different mental health clinics. Although patient has been sober for a year her functional status continues to decline, with a 35 pound weight gain in the past year, poor hygiene, and sleeping most of the day every day. Her  describes how patient becomes more childish, changing her voice and behaving inappropriately. Posteriorly, during the nexts months declining gradually in cognition, activity, memory, personality and calculation, and being more apathetic. In January 2024, patient got worse in a stepwise manner and started to walk worse and fall more often. Patient has been unable to work for at least 2 years.  She is currently under the care of a psychotherapist who recommended that she report to a health care facility for further evaluation and likely admission as patient has been unresponsive to outpatient therapy and medications.  Per  patient has not been coughing or indicating pain of any kind.  No recent illness.     Stepwise cognitive decline over the course of several years, more prominent over the past few weeks, unresponsive to pharmacotherapy/psychiatric intervention in outpt setting, Psych following at [facility].    In [facility] ED:  - Vitals: Normal  - Labs: Normal  - UA: cloudy urine  - Utox: + BZD  - CT head wo: frontotemporal atrophy not characteristic of age.     Transferred to [facility] after discussion with neurology attending Dr. Mccrary for further management.  (27 Mar 2024 22:33)                            14.6   7.00  )-----------( 278      ( 28 Mar 2024 05:30 )             42.3       03-28    142  |  106  |  13  ----------------------------<  94  3.9   |  27  |  0.79    Ca    10.0      28 Mar 2024 05:30    TPro  6.5  /  Alb  4.0  /  TBili  0.7  /  DBili  x   /  AST  25  /  ALT  27  /  AlkPhos  79  03-28    Vital Signs Last 24 Hrs  T(C): 36.3 (29 Mar 2024 06:37), Max: 36.7 (28 Mar 2024 21:20)  T(F): 97.4 (29 Mar 2024 06:37), Max: 98.1 (28 Mar 2024 21:20)  HR: 86 (29 Mar 2024 06:37) (85 - 99)  BP: 95/66 (29 Mar 2024 06:37) (95/66 - 121/78)  BP(mean): 92 (28 Mar 2024 21:20) (92 - 92)  RR: 18 (29 Mar 2024 06:37) (17 - 18)  SpO2: 95% (29 Mar 2024 06:37) (95% - 97%)    Parameters below as of 29 Mar 2024 06:37  Patient On (Oxygen Delivery Method): room air        MEDICATIONS  (STANDING):  ARIPiprazole 2.5 milliGRAM(s) Oral at bedtime  clonazePAM Oral Disintegrating Tablet 0.25 milliGRAM(s) Oral two times a day  enoxaparin Injectable 40 milliGRAM(s) SubCutaneous every 24 hours  fluvoxaMINE 150 milliGRAM(s) Oral at bedtime  influenza   Vaccine 0.5 milliLiter(s) IntraMuscular once  memantine 10 milliGRAM(s) Oral two times a day    MEDICATIONS  (PRN):  OLANZapine Disintegrating Tablet 5 milliGRAM(s) Oral daily PRN Severe agitation      T(C): 36.3 (03-29-24 @ 06:37), Max: 36.7 (03-28-24 @ 21:20)  HR: 86 (03-29-24 @ 06:37) (85 - 99)  BP: 95/66 (03-29-24 @ 06:37) (95/66 - 121/78)  RR: 18 (03-29-24 @ 06:37) (17 - 18)  SpO2: 95% (03-29-24 @ 06:37) (95% - 97%)        Physical Exam:     Neurological Exam:     Mental status: She is awake and alert but is oriented only to self and not location or time (AAO x1). Recent and remote memory intact.  Naming, repetition and comprehension intact.  impairment in attention as there is difficulty following two-step commands.  No dysarthria, no aphasia.  She does not know who the current president is    Cranial nerves: Pupils equally round and reactive to light, no nystagmus, extraocular muscles intact, V1 through V3 intact bilaterally and symmetric, face symmetric, hearing intact to finger rub. Shoulder strength intact. Unable to cooperate with exam for evaluating visual fields, jaw closing (motor V), palate elevation, or tongue movement    Motor:  Strength is 5/5 bilaterally with wrist flexion/extension, forearm flexion/extension, leg flexion/extension, dorsiflexion/plantarflexion.   strength 5/5.  Normal tone and bulk.  No abnormal movements     Sensation: Intact to light touch in the upper and lower extremities bilaterally    Coordination: No dysmetria on finger-to-nose and able to perform rapid alternating movements with repeated pronation/supination (No dysdiadokinesia)    Reflexes: 2+ in bilateral UE/LE, downgoing toes bilaterally. (-) [place]    Initial Functional Status Assessment :         Previous Level of Function:     · Ambulation Skills	independent  · Transfer Skills	independent  · ADL Skills	needed assist  · Additional Comments	Pt is a poor historian,  is at bedside and able to report PLOF. Pt lives with  in an elevator access apartment with no steps to enter. As per , there has been a recent decline in the Pt's ability to perform ADLs, so  has been assisting. Pt was able to ambulate in community without AD and recently has bee needing assist from .    Cognitive Status Examination:   · Orientation	person  · Level of Consciousness	confused; alert  · Follows Commands and Answers Questions	50% of the time; able to follow single-step instructions  · Personal Safety and Judgment	impaired  · Short Term Memory	impaired  · Long Term Memory	impaired    Range of Motion Exam:   · Range of Motion Examination	bilateral lower extremity ROM was WFL (within functional limits); bilateral upper extremity ROM was WFL (within functional limits)    Manual Muscle Testing:   · Manual Muscle Testing Results	grossly assessed due to  at least 3/5 BL UE & LE based on antigravity mobility.    Bed Mobility: Rolling/Turning:     · Level of [place]	supervision  · Physical Assist/Nonphysical Assist	verbal cues; nonverbal cues (demo/gestures)  · Assistive Device	bed rails    Bed Mobility: Scooting/Bridging:     · Level of [place]	supervision  · Physical Assist/Nonphysical Assist	verbal cues; nonverbal cues (demo/gestures)    Bed Mobility: Sit to Supine:     · Level of [place]	supervision; contact guard  · Physical Assist/Nonphysical Assist	verbal cues; nonverbal cues (demo/gestures); 1 person assist  · Assistive Device	bed rails    Bed Mobility: Supine to Sit:     · Level of [place]	supervision  · Physical Assist/Nonphysical Assist	verbal cues; nonverbal cues (demo/gestures)  · Assistive Device	bed rails    Bed Mobility Analysis:     · Bed Mobility Limitations	decreased ability to use legs for bridging/pushing  · Impairments Contributing to Impaired Bed Mobility	impaired balance; decreased strength; cognition    Transfer: Sit to Stand:     · Level of [place]	contact guard  · Physical Assist/Nonphysical Assist	1 person assist  · Weight-Bearing Restrictions	weight-bearing as tolerated  · Assistive Device	Hand held assist    Transfer: Stand to Sit:     · Level of [place]	contact guard  · Physical Assist/Nonphysical Assist	1 person assist  · Weight-Bearing Restrictions	weight-bearing as tolerated  · Assistive Device	Hand held assist    Sit/Stand Transfer Safety Analysis:     · Transfer Safety Concerns Noted	decreased weight-shifting ability; decreased safety awareness  · Impairments Contributing to Impaired Transfers	impaired balance; cognition; impaired postural control; decreased strength    Gait Skills:     · Level of [place]	contact guard  · Physical Assist/Nonphysical Assist	1 person assist; nonverbal cues (demo/gestures); verbal cues  · Weight-Bearing Restrictions	weight-bearing as tolerated  · Assistive Device	Hand held assist  · Gait Distance	4 side steps at bedside    Gait Analysis:     · Gait Pattern Used	2-point gait  · Gait Deviations Noted	decreased whit  · Impairments Contributing to Gait Deviations	cognition; impaired balance; decreased strength    Balance Skills Assessment:     · Sitting Balance: Static	good minus  · Sitting Balance: Dynamic	good minus  · Sit-to-Stand Balance	fair plus  · Standing Balance: Static	fair plus  · Standing Balance: Dynamic	fair plus  · Systems Impairment Contributing to Balance Disturbance	cognitive  · Identified Impairments Contributing to Balance Disturbance	decreased strength; impaired postural control; impaired coordination    Sensory Examination:   Sensory Examination:  · Coordination Assessed	finger to nose; 3/3 grossly intact with mild dysmetria      Proprioception:   · Coordination Assessed	finger to nose; 3/3 grossly intact with mild dysmetria      Treatment Location:   · Comments	R hand dominant; (L) hand  5/5, (R) hand  5/5. CN Testing: B/L Frontalis intact; B/L buccinator intact; smile symmetrical; tongue protrusion at midline; B/L eyes open/close intact; Shoulder elevation: intact bilaterally; Vision H-Test: ARIC 2/2 cognition; Convergence/Divergence: ARIC 2/2 cognition, Vision Quadrant Test: ARIC 2/2 cognition    Clinical Impressions:   · Criteria for Skilled Therapeutic Interventions	impairments found; rehab potential; anticipated equipment needs at discharge; functional limitations in following categories; therapy frequency; predicted duration of therapy intervention; anticipated discharge recommendation  · Impairments Found (describe specific impairments)	aerobic capacity/endurance; arousal, attention, and cognition; cognitive impairment; gait, locomotion, and balance; poor safety awareness; posture  · Functional Limitations in Following Categories (describe specific limitations)	self-care; home management; community/leisure  · Rehab Potential	good, to achieve stated therapy goals        Upper Body Dressing Training:     · Level of [place]	contact guard; don/doff gown at EOB  · Physical Assist/Nonphysical Assist	1 person assist; nonverbal cues (demo/gestures); verbal cues    Treatment Locations:   · Comments	Patient scored a 11/30 on the MOCA indicating severe cognitive impairment. Pt demonstrating deficits in visuospatial/executive functioning, memory, attention, language, and abstract thinking.        PM&R Impression : as above    Current disposition plan recommendation :   pending

## 2024-03-29 NOTE — PROGRESS NOTE ADULT - ASSESSMENT
38 year old woman with hx of alcohol use disorder(sober x 1 year), carries diagnoses of OCD, depression, anxiety, with 2 prior psychiatric hospitalizations in the past year. Presented with worsening of functional status and behavioral disturbance     # Progressive cognitive impairment  # Suspected frontotemporal dementia   - workup per primary team   PET CT head consistent with neurodegenerative disease, suggestive of fronto-temporal lobe dementia   [ ] f/u CSF studies   [ ] f/u MRI brain     # Anxiety   # Depression   # Obsessive Compulsive Disorder   behavioral health following   On home abilify 2.5mg qhs, fluvoxamine 150mg qHS , klonopin 0.25 BID     DVT ppx - lovenox

## 2024-03-29 NOTE — PROGRESS NOTE ADULT - ASSESSMENT
Neurology    38 year old female with a history of alcohol use disorder (sober for 1 year), OCD, depression, anxiety with 2 prior psychiatric hospitalizations in the past year, currently taking Abilify 5 mg, Klonopin 0.25 mg, fluvoxamine 150 mg and memantine 10 mg. Patient BIBEMS to [facility] for altered mental status. Patient brought in from outpatient facility for inability or perform blood work. As per EMS, patient was too agitated to perform blood work. Stepwise cognitive decline over the course of several years, more prominent over the past few weeks, unresponsive to pharmacotherapy/psychiatric intervention in outpt setting, CT head wo shows frontotemporal atrophy not characteristic of age concerning for potential frontotemporal dementia. Alternative diagnoses include Midland's disease, prion disease (eg. Creutzfelt-Gilson disease), or autoimmune encephalitis. Psych following at [facility]. Transferred to [facility] after discussion with neurology attending Dr. Mccrary for further management.      Problem/Plan - 1:  ·  Problem: Progressive cognitive impairment.   PLAN:  - CT Head on 3/26 shows atrophy of the anterior frontal and temporal lobes   - Psych f/o with patient on 3/28  · On home med: continue memantine 10 mg BID  - Genetic testing - frontotemporal dementia panel and HTT gene  - Lumbar puncture for evaluation of possible prion disease (eg. 14-3-3, RT-QuIC, tau), autoimmune process  - PET scan   - MRI w/ contrast     Problem/Plan - 2:  ·  Problem: Psychosis, unspecified psychosis type.   PLAN:  - If severe agitation, olanzapine 5 mg PRN  - Order vEEG     Problem/Plan - 3:  ·  Problem: OCD (obsessive compulsive disorder).   ·  Plan: On home meds: Abilify 2.5 mg QHS.     Problem/Plan - 4:  ·  Problem: Mixed anxiety and depressive disorder.   ·  Plan: On home meds: Fluvoxamine 150 mg QHS and Klonopin 0.25 mg BID.     Problem/Plan - 5:  ·  Problem: History of alcohol use disorder.   ·  Plan: No active. Possible cognitive impairment related.     Problem/Plan - 6:  ·  Problem: Preventive measure.   ·  Plan: Regular diet  Replete electrolytes  DVT prophylaxis  Full code  Dispo: [facility].

## 2024-03-29 NOTE — CHART NOTE - NSCHARTNOTEFT_GEN_A_CORE
Attempted to see pt for follow up this afternoon; however pt was drowsy and resting after PET scan; interview deferred for today.  Spoke to  at bedside - he asked about mental exercises he could do with pt to help promote brain activity. Discussed resuming puzzles, which is something pt had enjoyed at home.  Discussed providing frequent re-orientations to patient and providing familiar, comforting objects.   reports pt frequently asks for candy cane maddy kisses- continuously asks for them. Reports pt became agitated yesterday, in which she endorsed wanting to go home, sometimes trying to get out of bed, but was easily redirected.    Collateral received from pt's outpatient psychiatrist, Dr. Figueroa: He confirms that the onset of OCD was severe and sudden, happening about 2.5 years ago after  got covid but pt's test was negative. Pt rapidly began becoming preoccupied with hand washing, buying clorox wipes. 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 She was admitted to a psychiatric unit (mission to [place]) with some improvement on risperdal and prozac, but months after discharge she got worse; OCD behaviors never went away fully. Around that time he started working with patient (about a year ago) and switched her meds to fluvoxamine, abilify, and small dose klonopin. She had some initial improvement with these meds, up until 4-5 months ago - at this time, pt developed paucity of movement and gait shuffling. Abilify was decreased 5 to 2.5mg and Luvox decreased from 200 to 150mg as pt reported drowsiness. Pt also started having falls. Although she had slowed movement,she did not have other parkinsonian features such as cogwheeling. She did have generalized ataxia. He encouraged her to get further workup incl lupus and anti-NMDA encephalitis. Also considered sending ceruloplasmin and porphyria workup. Feels relieved pt is now getting fully neuro workup.    See initial psych consult note from 3/28 for full recs:  DIFFERENTIAL:  Neurocognitive disorder  Alcohol use disorder, in remission  OCD *due to another medical condition  R/o MDD due to another medical condition, r/o catatonic features.    PLAN:  - consider sending ceruloplasmin and porphyria workup  - agree with further neuro workup incl MRI brain, vEEG, LP  - outpatient psychiatrist Dr. Figueroa: [phone]:   - Continue current meds: fluvoxamine 150mg daily, abilify 2.5mg daily, klonopin 0.25mg BID (but hold for sedation; if pt persistently drowsy would make klonopin PRN)  - For agitation not responsive to verbal redirection or other gentle measures, can utilize zyprexa 2.5mg PRN; check EKG for Qtc   - Do not suspect a psychiatric hospitalization is indicated, given that current state is most likely due to an underlying neurological illness and this underlying condition would not be responsive to psychopharmacologic intervention (though specific symptoms can still be treated as needed, including agitation or abulia).  - Psychiatry will continue to follow

## 2024-03-30 LAB — CRYPTOC AG CSF-ACNC: NEGATIVE — SIGNIFICANT CHANGE UP

## 2024-03-30 PROCEDURE — 99232 SBSQ HOSP IP/OBS MODERATE 35: CPT

## 2024-03-30 PROCEDURE — 99233 SBSQ HOSP IP/OBS HIGH 50: CPT

## 2024-03-30 RX ADMIN — ARIPIPRAZOLE 2.5 MILLIGRAM(S): 15 TABLET ORAL at 21:46

## 2024-03-30 RX ADMIN — FLUVOXAMINE MALEATE 150 MILLIGRAM(S): 25 TABLET ORAL at 21:46

## 2024-03-30 RX ADMIN — MEMANTINE HYDROCHLORIDE 10 MILLIGRAM(S): 10 TABLET ORAL at 06:34

## 2024-03-30 RX ADMIN — Medication 2 MILLIGRAM(S): at 10:43

## 2024-03-30 RX ADMIN — ENOXAPARIN SODIUM 40 MILLIGRAM(S): 100 INJECTION SUBCUTANEOUS at 21:46

## 2024-03-30 RX ADMIN — OLANZAPINE 5 MILLIGRAM(S): 15 TABLET, FILM COATED ORAL at 06:34

## 2024-03-30 RX ADMIN — Medication 0.25 MILLIGRAM(S): at 07:19

## 2024-03-30 RX ADMIN — MEMANTINE HYDROCHLORIDE 10 MILLIGRAM(S): 10 TABLET ORAL at 18:47

## 2024-03-30 RX ADMIN — Medication 0.25 MILLIGRAM(S): at 18:47

## 2024-03-30 NOTE — PROGRESS NOTE ADULT - SUBJECTIVE AND OBJECTIVE BOX
Neurology Progress Note    Interval History:  No acute events overnight.       Medications:  ARIPiprazole 2.5 milliGRAM(s) Oral at bedtime  clonazePAM Oral Disintegrating Tablet 0.25 milliGRAM(s) Oral two times a day  enoxaparin Injectable 40 milliGRAM(s) SubCutaneous every 24 hours  fluvoxaMINE 150 milliGRAM(s) Oral at bedtime  influenza   Vaccine 0.5 milliLiter(s) IntraMuscular once  memantine 10 milliGRAM(s) Oral two times a day  OLANZapine Disintegrating Tablet 5 milliGRAM(s) Oral daily PRN      Vital Signs Last 24 Hrs  T(C): 36.4 (30 Mar 2024 06:55), Max: 36.6 (29 Mar 2024 20:32)  T(F): 97.6 (30 Mar 2024 06:55), Max: 97.8 (29 Mar 2024 20:32)  HR: 87 (30 Mar 2024 06:55) (87 - 97)  BP: 90/60 (30 Mar 2024 06:55) (90/60 - 102/70)  BP(mean): 70 (30 Mar 2024 06:55) (70 - 70)  RR: 17 (30 Mar 2024 06:55) (17 - 18)  SpO2: 95% (30 Mar 2024 06:55) (95% - 96%)    Parameters below as of 29 Mar 2024 20:32  Patient On (Oxygen Delivery Method): room air        Neurological Examination:  General:  Appearance is consistent with chronologic age.  No abnormal facies.  Gross skin survey within normal limits.    Cognitive/Language:  Awake, alert, and oriented to person, place, time and date.  Recent and remote memory intact.  Fund of knowledge is appropriate.  Naming, repetition and comprehension intact.   Nondysarthric.    Cranial Nerves  - Eyes: Visual acuity intact, Visual fields full.  EOMI w/o nystagmus, skew or reported double vision.  PERRL.  No ptosis/weakness of eyelid closure.    - Face:  Facial sensation normal V1 - 3, no facial asymmetry.    - Ears/Nose/Throat:  Hearing grossly intact b/l to finger rub.  Palate elevates midline.  Tongue and uvula midline.   Motor examination:  Upper Extremities: L 5/5, R 5/5; Lower extremities: L 5/5, R 5/5.  No observable drift. Normal tone and bulk. No tenderness, twitching, tremors or involuntary movements.  Sensory examination:   Intact to light touch and pinprick, pain, temperature and proprioception and vibration in all extremities.  Reflexes:   2+ b/l biceps, triceps, brachioradialis, patella and achilles.  Plantar response downgoing b/l.  Jaw jerk, Earl, clonus absent.  Cerebellum:   FTN/HKS intact.  No dysmetria or dysdiadokinesia.  Gait narrow based and normal.    Labs:            PT/INR - ( 28 Mar 2024 16:58 )   PT: 10.9 sec;   INR: 0.95          PTT - ( 28 Mar 2024 16:58 )  PTT:33.5 sec     Neurology Progress Note    Interval History:  No acute events overnight. Patient was agitated in the MRI and hence couldn't complete the study. Patient was seen and examined at bedside, was drowsy as she had received ativan for MRI and barely participated in the exam.       Medications:  ARIPiprazole 2.5 milliGRAM(s) Oral at bedtime  clonazePAM Oral Disintegrating Tablet 0.25 milliGRAM(s) Oral two times a day  enoxaparin Injectable 40 milliGRAM(s) SubCutaneous every 24 hours  fluvoxaMINE 150 milliGRAM(s) Oral at bedtime  influenza   Vaccine 0.5 milliLiter(s) IntraMuscular once  memantine 10 milliGRAM(s) Oral two times a day  OLANZapine Disintegrating Tablet 5 milliGRAM(s) Oral daily PRN      Vital Signs Last 24 Hrs  T(C): 36.4 (30 Mar 2024 06:55), Max: 36.6 (29 Mar 2024 20:32)  T(F): 97.6 (30 Mar 2024 06:55), Max: 97.8 (29 Mar 2024 20:32)  HR: 87 (30 Mar 2024 06:55) (87 - 97)  BP: 90/60 (30 Mar 2024 06:55) (90/60 - 102/70)  BP(mean): 70 (30 Mar 2024 06:55) (70 - 70)  RR: 17 (30 Mar 2024 06:55) (17 - 18)  SpO2: 95% (30 Mar 2024 06:55) (95% - 96%)    Parameters below as of 29 Mar 2024 20:32  Patient On (Oxygen Delivery Method): room air        Neurological Examination:  General:  Appearance is consistent with chronologic age.  No abnormal facies.  Gross skin survey within normal limits.    Cognitive/Language: drowsy but arousable and oriented to person, place (place and self)., repetition intact.   Nondysarthric.  able to spell WORLD backward  Cranial Nerves  - Eyes: kept her eyes closed throught out the exam  - Face: no facial asymmetry.    - Ears/Nose/Throat:  Hearing grossly intact b/l to finger rub. Tongue midline.   Motor examination:  did not participate in strength test   Sensory examination:  did not participate in sensation test     Labs:            PT/INR - ( 28 Mar 2024 16:58 )   PT: 10.9 sec;   INR: 0.95          PTT - ( 28 Mar 2024 16:58 )  PTT:33.5 sec

## 2024-03-30 NOTE — PROGRESS NOTE ADULT - SUBJECTIVE AND OBJECTIVE BOX
Patient is a 38y old  Female who presents with a chief complaint of Altered mental status and progressive cognitive impairment (29 Mar 2024 17:16)    INTERVAL EVENTS:      SUBJECTIVE:      MEDICATIONS:  MEDICATIONS  (STANDING):  ARIPiprazole 2.5 milliGRAM(s) Oral at bedtime  clonazePAM Oral Disintegrating Tablet 0.25 milliGRAM(s) Oral two times a day  enoxaparin Injectable 40 milliGRAM(s) SubCutaneous every 24 hours  fluvoxaMINE 150 milliGRAM(s) Oral at bedtime  influenza   Vaccine 0.5 milliLiter(s) IntraMuscular once  memantine 10 milliGRAM(s) Oral two times a day    MEDICATIONS  (PRN):  OLANZapine Disintegrating Tablet 5 milliGRAM(s) Oral daily PRN Severe agitation      Allergies    No Known Allergies    Intolerances        OBJECTIVE:  Vital Signs Last 24 Hrs  T(C): 36.4 (30 Mar 2024 06:55), Max: 36.6 (29 Mar 2024 20:32)  T(F): 97.6 (30 Mar 2024 06:55), Max: 97.8 (29 Mar 2024 20:32)  HR: 87 (30 Mar 2024 06:55) (87 - 97)  BP: 90/60 (30 Mar 2024 06:55) (90/60 - 102/70)  BP(mean): 70 (30 Mar 2024 06:55) (70 - 70)  RR: 17 (30 Mar 2024 06:55) (17 - 18)  SpO2: 95% (30 Mar 2024 06:55) (95% - 96%)    Parameters below as of 29 Mar 2024 20:32  Patient On (Oxygen Delivery Method): room air      I&O's Summary      PHYSICAL EXAM:      LABS:            PT/INR - ( 28 Mar 2024 16:58 )   PT: 10.9 sec;   INR: 0.95          PTT - ( 28 Mar 2024 16:58 )  PTT:33.5 sec  CAPILLARY BLOOD GLUCOSE            MICRODATA:    Culture - CSF with Gram Stain (collected 28 Mar 2024 15:51)  Source: .CSF  Gram Stain (28 Mar 2024 16:44):    No organisms seen    No WBC's seen.  Preliminary Report (29 Mar 2024 08:04):    No growth to date    Culture - Fungal, CSF (collected 28 Mar 2024 15:15)  Source: .CSF  Preliminary Report (29 Mar 2024 08:27):    Testing in progress        RADIOLOGY/OTHER STUDIES:   Patient is a 38y old  Female who presents with a chief complaint of Altered mental status and progressive cognitive impairment (29 Mar 2024 17:16)    INTERVAL EVENTS:  Unable to have MRI due to agitation.    SUBJECTIVE:  Seen and examined at bedside. Oriented only to self. Able to answer simple questions with one word answers.   Denies any complaints. Thinks she is in [place].   at bedside, reports this is her current baseline.     MEDICATIONS:  MEDICATIONS  (STANDING):  ARIPiprazole 2.5 milliGRAM(s) Oral at bedtime  clonazePAM Oral Disintegrating Tablet 0.25 milliGRAM(s) Oral two times a day  enoxaparin Injectable 40 milliGRAM(s) SubCutaneous every 24 hours  fluvoxaMINE 150 milliGRAM(s) Oral at bedtime  influenza   Vaccine 0.5 milliLiter(s) IntraMuscular once  memantine 10 milliGRAM(s) Oral two times a day    MEDICATIONS  (PRN):  OLANZapine Disintegrating Tablet 5 milliGRAM(s) Oral daily PRN Severe agitation      Allergies    No Known Allergies    Intolerances        OBJECTIVE:  Vital Signs Last 24 Hrs  T(C): 36.4 (30 Mar 2024 06:55), Max: 36.6 (29 Mar 2024 20:32)  T(F): 97.6 (30 Mar 2024 06:55), Max: 97.8 (29 Mar 2024 20:32)  HR: 87 (30 Mar 2024 06:55) (87 - 97)  BP: 90/60 (30 Mar 2024 06:55) (90/60 - 102/70)  BP(mean): 70 (30 Mar 2024 06:55) (70 - 70)  RR: 17 (30 Mar 2024 06:55) (17 - 18)  SpO2: 95% (30 Mar 2024 06:55) (95% - 96%)    Parameters below as of 29 Mar 2024 20:32  Patient On (Oxygen Delivery Method): room air      I&O's Summary    PHYSICAL EXAM:  General: NAD, resting in bed, sleepy  HEENT: NC/AT; PERRL, clear conjunctiva  Neck: supple  Respiratory: CTA b/l  Cardiovascular: +S1/S2; RRR  Abdomen: soft, NT/ND; +BS x4  Extremities: 2+ peripheral pulses b/l; no LE edema  Skin: normal color and turgor; no rash  Neurological: oriented to self, sleepy but arousable and follows commands.    LABS:    PT/INR - ( 28 Mar 2024 16:58 )   PT: 10.9 sec;   INR: 0.95          PTT - ( 28 Mar 2024 16:58 )  PTT:33.5 sec  CAPILLARY BLOOD GLUCOSE            MICRODATA:    Culture - CSF with Gram Stain (collected 28 Mar 2024 15:51)  Source: .CSF  Gram Stain (28 Mar 2024 16:44):    No organisms seen    No WBC's seen.  Preliminary Report (29 Mar 2024 08:04):    No growth to date    Culture - Fungal, CSF (collected 28 Mar 2024 15:15)  Source: .CSF  Preliminary Report (29 Mar 2024 08:27):    Testing in progress        RADIOLOGY/OTHER STUDIES:    < from: NM PET Brain Study Alzheimer's vs Frontotemporal Dementia (FTD) Study (03.29.24 @ 13:07) >  IMPRESSION:    Note, there is extensive motion artifact on theFDG-PET scan and repeat   imaging is recommended.    Abnormal hypometabolism particularly pronounced in the frontotemporal   regions, with accompanying volume loss on structural imaging, findings   consistent with underlying neurodegenerative disease, pattern most   suggestive of frontotemporal lobar degeneration (FTLD).    Note, given prominent atrophy/hypometabolism of the caudate nuclei and   patient's young age, [place]'s disease (HD), should also be considered   in the proper clinical setting.    TAU-PET would be helpful for further assessment.    Correlation with clinical exam, CSF sampling, and neuropsychological   assessment is advised.    --- End of Report ---    < end of copied text >        < from: CT Head No Cont (03.27.24 @ 18:01) >  IMPRESSION:   No acute abnormality.    Frontal and temporal lobe atrophy.    < end of copied text >

## 2024-03-30 NOTE — PROGRESS NOTE ADULT - ASSESSMENT
38-year-old female with a history of alcohol use disorder (sober for 1 year), OCD, depression, anxiety with 2 prior psychiatric hospitalizations in the past year, currently taking Abilify 5 mg, Klonopin 0.25 mg, fluvoxamine 150 mg and memantine 10 mg. Patient BIBEMS to [facility] for altered mental status. Patient brought in from outpatient facility for inability or perform blood work. As per EMS, patient was too agitated to perform blood work. Stepwise cognitive decline over the course of several years, more prominent over the past few weeks, unresponsive to pharmacotherapy/psychiatric intervention in outpt setting, CT head wo shows frontotemporal atrophy not characteristic of age concerning for potential frontotemporal dementia. Alternative diagnoses include [place]'s disease, prion disease (eg. Creutzfelt-Gilson disease), or autoimmune encephalitis. Transferred to [facility] after discussion with neurology attending Dr. Mccrary for further management.        Problem/Plan - 1:  ·  Problem: Progressive cognitive impairment.   PLAN:  - CT Head on 3/26 shows atrophy of the anterior frontal and temporal lobes   - Psych f/o with patient on 3/28  - PET imaging done on 3/29, consistent with frontal and temporal atrophy   - MRI w/contrast: patient was restless in the MRI and hence had to be cancelled  · On home med: continue memantine 10 mg BID  - Genetic testing - frontotemporal dementia panel and HTT gene  - Lumbar puncture done on 3/28. Neg results so far, pending further results  - Send ceruloplasmin and porphyria workup     Problem/Plan - 2:  ·  Problem: Psychosis, unspecified psychosis type.   PLAN:  - If severe agitation, olanzapine 5 mg PRN  - vEEG neg, dc veeg     Problem/Plan - 3:  ·  Problem: OCD (obsessive compulsive disorder).   ·  Plan: On home meds: Abilify 2.5 mg QHS.     Problem/Plan - 4:  ·  Problem: Mixed anxiety and depressive disorder.   ·  Plan: On home meds: Fluvoxamine 150 mg QHS and Klonopin 0.25 mg BID.     Problem/Plan - 5:  ·  Problem: History of alcohol use disorder.   ·  Plan: No active. Possible cognitive impairment related.     Problem/Plan - 6:  ·  Problem: Preventive measure.   ·  Plan: Regular diet  Replete electrolytes  DVT prophylaxis  Full code  Dispo: [facility]   38-year-old female with a history of alcohol use disorder (sober for 1 year), OCD, depression, anxiety with 2 prior psychiatric hospitalizations in the past year, currently taking Abilify 5 mg, Klonopin 0.25 mg, fluvoxamine 150 mg and memantine 10 mg. Patient BIBEMS to [facility] for altered mental status. Patient brought in from outpatient facility for inability or perform blood work. As per EMS, patient was too agitated to perform blood work. Stepwise cognitive decline over the course of several years, more prominent over the past few weeks, unresponsive to pharmacotherapy/psychiatric intervention in outpt setting, CT head wo shows frontotemporal atrophy not characteristic of age concerning for potential frontotemporal dementia. Alternative diagnoses include [place]'s disease, prion disease (eg. Creutzfelt-Gilson disease), or autoimmune encephalitis. Transferred to [facility] after discussion with neurology attending Dr. Mccrary for further management.        Problem/Plan - 1:  ·  Problem: Progressive cognitive impairment.   PLAN:  - CT Head on 3/26 shows atrophy of the anterior frontal and temporal lobes   - Psych f/o with patient on 3/28  - PET imaging done on 3/29, consistent with frontal and temporal (R>T) atrophy   - MRI w/contrast: patient was restless in the MRI and hence had to be cancelled  · On home med: continue memantine 10 mg BID  - Genetic testing - frontotemporal dementia panel and HTT gene (consent sent)  - Lumbar puncture done on 3/28. Neg results so far, pending further results  - Send ceruloplasmin and porphyria workup     Problem/Plan - 2:  ·  Problem: Psychosis, unspecified psychosis type.   PLAN:  - If severe agitation, olanzapine 5 mg PRN  - vEEG neg, dc veeg     Problem/Plan - 3:  ·  Problem: OCD (obsessive compulsive disorder).   ·  Plan: On home meds: Abilify 2.5 mg QHS.     Problem/Plan - 4:  ·  Problem: Mixed anxiety and depressive disorder.   ·  Plan: On home meds: Fluvoxamine 150 mg QHS and Klonopin 0.25 mg BID.     Problem/Plan - 5:  ·  Problem: History of alcohol use disorder.   ·  Plan: No active. Possible cognitive impairment related.     Problem/Plan - 6:  ·  Problem: Preventive measure.   ·  Plan: Regular diet  Replete electrolytes  DVT prophylaxis  Full code  Dispo: [facility]   38-year-old female with a history of alcohol use disorder (sober for 1 year), OCD, depression, anxiety with 2 prior psychiatric hospitalizations in the past year, currently taking Abilify 5 mg, Klonopin 0.25 mg, fluvoxamine 150 mg and memantine 10 mg. Patient BIBEMS to [facility] for altered mental status. Patient brought in from outpatient facility for inability or perform blood work. As per EMS, patient was too agitated to perform blood work. Stepwise cognitive decline over the course of several years, more prominent over the past few weeks, unresponsive to pharmacotherapy/psychiatric intervention in outpt setting, CT head wo shows frontotemporal atrophy not characteristic of age concerning for potential frontotemporal dementia. Alternative diagnoses include [place]'s disease, prion disease (eg. Creutzfelt-Gilson disease), or autoimmune encephalitis. Transferred to [facility] after discussion with neurology attending Dr. Mccrary for further management.        Problem/Plan - 1:  ·  Problem: Progressive cognitive impairment.   PLAN:  - CT Head on 3/26 shows atrophy of the anterior frontal and temporal lobes   - Psych f/o with patient on 3/28  - PET imaging done on 3/29 with frontotemporal hypometabolism, as well as caudate hypometabolism   - MRI w/contrast: patient was restless in the MRI and hence had to be cancelled  · On home med: continue memantine 10 mg BID  - Genetic testing - frontotemporal dementia panel and HTT gene (consent sent)  - Lumbar puncture done on 3/28. Neg results so far, pending further results  - Send ceruloplasmin and porphyria workup     Problem/Plan - 2:  ·  Problem: Psychosis, unspecified psychosis type.   PLAN:  - If severe agitation, olanzapine 5 mg PRN  - vEEG neg, dc veeg     Problem/Plan - 3:  ·  Problem: OCD (obsessive compulsive disorder).   ·  Plan: On home meds: Abilify 2.5 mg QHS.     Problem/Plan - 4:  ·  Problem: Mixed anxiety and depressive disorder.   ·  Plan: On home meds: Fluvoxamine 150 mg QHS and Klonopin 0.25 mg BID.     Problem/Plan - 5:  ·  Problem: History of alcohol use disorder.   ·  Plan: No active. Possible cognitive impairment related.     Problem/Plan - 6:  ·  Problem: Preventive measure.   ·  Plan: Regular diet  Replete electrolytes  DVT prophylaxis  Full code  Dispo: [facility]

## 2024-03-30 NOTE — PROGRESS NOTE ADULT - ATTENDING COMMENTS
Unable to tolerate MRI today - canceled - can hold off for now as PET scan was informative and genetic testing / CSF studies more important for providing a definitive diagnosis

## 2024-03-30 NOTE — PROGRESS NOTE ADULT - ASSESSMENT
38 year old woman with hx of alcohol use disorder (sober x 1 year), carries diagnoses of OCD, depression, anxiety, with 2 prior psychiatric hospitalizations in the past year. Presented with worsening of functional status and behavioral disturbance     # Progressive cognitive impairment  # Suspected frontotemporal dementia   - workup per primary team   PET CT head consistent with neurodegenerative disease, suggestive of fronto-temporal lobe dementia.  Difficulty obtaining MRI due to agitation, however may not be needed given PET findings. WIll defer to primary neurology team.   [ ] f/u CSF studies     # Anxiety   # Depression   # Obsessive Compulsive Disorder   behavioral health following   On home abilify 2.5mg qhs, fluvoxamine 150mg qHS , klonopin 0.25 BID     DVT ppx - lovenox

## 2024-03-31 PROCEDURE — 99233 SBSQ HOSP IP/OBS HIGH 50: CPT

## 2024-03-31 PROCEDURE — 99232 SBSQ HOSP IP/OBS MODERATE 35: CPT

## 2024-03-31 RX ADMIN — ENOXAPARIN SODIUM 40 MILLIGRAM(S): 100 INJECTION SUBCUTANEOUS at 21:03

## 2024-03-31 RX ADMIN — FLUVOXAMINE MALEATE 150 MILLIGRAM(S): 25 TABLET ORAL at 21:03

## 2024-03-31 RX ADMIN — Medication 0.25 MILLIGRAM(S): at 18:21

## 2024-03-31 RX ADMIN — ARIPIPRAZOLE 2.5 MILLIGRAM(S): 15 TABLET ORAL at 21:03

## 2024-03-31 RX ADMIN — MEMANTINE HYDROCHLORIDE 10 MILLIGRAM(S): 10 TABLET ORAL at 18:42

## 2024-03-31 RX ADMIN — OLANZAPINE 5 MILLIGRAM(S): 15 TABLET, FILM COATED ORAL at 21:03

## 2024-03-31 RX ADMIN — Medication 0.25 MILLIGRAM(S): at 09:56

## 2024-03-31 RX ADMIN — MEMANTINE HYDROCHLORIDE 10 MILLIGRAM(S): 10 TABLET ORAL at 06:54

## 2024-03-31 NOTE — PROGRESS NOTE ADULT - SUBJECTIVE AND OBJECTIVE BOX
Patient is a 38y old  Female who presents with a chief complaint of Altered mental status and progressive cognitive impairment (30 Mar 2024 12:35)    INTERVAL EVENTS:      SUBJECTIVE:      MEDICATIONS:  MEDICATIONS  (STANDING):  ARIPiprazole 2.5 milliGRAM(s) Oral at bedtime  clonazePAM Oral Disintegrating Tablet 0.25 milliGRAM(s) Oral two times a day  enoxaparin Injectable 40 milliGRAM(s) SubCutaneous every 24 hours  fluvoxaMINE 150 milliGRAM(s) Oral at bedtime  influenza   Vaccine 0.5 milliLiter(s) IntraMuscular once  memantine 10 milliGRAM(s) Oral two times a day    MEDICATIONS  (PRN):  OLANZapine Disintegrating Tablet 5 milliGRAM(s) Oral daily PRN Severe agitation      Allergies    No Known Allergies    Intolerances        OBJECTIVE:  Vital Signs Last 24 Hrs  T(C): 36.9 (30 Mar 2024 21:30), Max: 36.9 (30 Mar 2024 21:30)  T(F): 98.5 (30 Mar 2024 21:30), Max: 98.5 (30 Mar 2024 21:30)  HR: 95 (30 Mar 2024 21:30) (93 - 95)  BP: 97/67 (30 Mar 2024 21:30) (95/66 - 97/67)  BP(mean): --  RR: 17 (30 Mar 2024 21:30) (17 - 17)  SpO2: 98% (30 Mar 2024 21:30) (98% - 98%)    Parameters below as of 30 Mar 2024 21:30  Patient On (Oxygen Delivery Method): room air      I&O's Summary      PHYSICAL EXAM:      LABS:              CAPILLARY BLOOD GLUCOSE            MICRODATA:    Culture - CSF with Gram Stain (collected 28 Mar 2024 15:51)  Source: .CSF  Gram Stain (28 Mar 2024 16:44):    No organisms seen    No WBC's seen.  Preliminary Report (29 Mar 2024 08:04):    No growth to date    Culture - Fungal, CSF (collected 28 Mar 2024 15:15)  Source: .CSF  Preliminary Report (30 Mar 2024 15:04):    Culture is being performed. Fungal cultures are held for 4 weeks.        RADIOLOGY/OTHER STUDIES:   Patient is a 38y old  Female who presents with a chief complaint of Altered mental status and progressive cognitive impairment (30 Mar 2024 12:35)    INTERVAL EVENTS:  arlene    SUBJECTIVE:  denies complaints. no change from yesterday. Rest of 12 point ROS negative, except where noted above.     MEDICATIONS:  MEDICATIONS  (STANDING):  ARIPiprazole 2.5 milliGRAM(s) Oral at bedtime  clonazePAM Oral Disintegrating Tablet 0.25 milliGRAM(s) Oral two times a day  enoxaparin Injectable 40 milliGRAM(s) SubCutaneous every 24 hours  fluvoxaMINE 150 milliGRAM(s) Oral at bedtime  influenza   Vaccine 0.5 milliLiter(s) IntraMuscular once  memantine 10 milliGRAM(s) Oral two times a day    MEDICATIONS  (PRN):  OLANZapine Disintegrating Tablet 5 milliGRAM(s) Oral daily PRN Severe agitation      Allergies    No Known Allergies    Intolerances        OBJECTIVE:  Vital Signs Last 24 Hrs  T(C): 36.9 (30 Mar 2024 21:30), Max: 36.9 (30 Mar 2024 21:30)  T(F): 98.5 (30 Mar 2024 21:30), Max: 98.5 (30 Mar 2024 21:30)  HR: 95 (30 Mar 2024 21:30) (93 - 95)  BP: 97/67 (30 Mar 2024 21:30) (95/66 - 97/67)  BP(mean): --  RR: 17 (30 Mar 2024 21:30) (17 - 17)  SpO2: 98% (30 Mar 2024 21:30) (98% - 98%)    Parameters below as of 30 Mar 2024 21:30  Patient On (Oxygen Delivery Method): room air      I&O's Summary    General: NAD, resting in bed, sleepy  HEENT: NC/AT; PERRL, clear conjunctiva  Neck: supple  Respiratory: CTA b/l  Cardiovascular: +S1/S2; RRR  Abdomen: soft, NT/ND; +BS x4  Extremities: 2+ peripheral pulses b/l; no LE edema  Skin: normal color and turgor; no rash  Neurological: oriented to self, sleepy but arousable and follows commands.      LABS:              CAPILLARY BLOOD GLUCOSE            MICRODATA:    Culture - CSF with Gram Stain (collected 28 Mar 2024 15:51)  Source: .CSF  Gram Stain (28 Mar 2024 16:44):    No organisms seen    No WBC's seen.  Preliminary Report (29 Mar 2024 08:04):    No growth to date    Culture - Fungal, CSF (collected 28 Mar 2024 15:15)  Source: .CSF  Preliminary Report (30 Mar 2024 15:04):    Culture is being performed. Fungal cultures are held for 4 weeks.        RADIOLOGY/OTHER STUDIES:    < from: NM PET Brain Study Alzheimer's vs Frontotemporal Dementia (FTD) Study (03.29.24 @ 13:07) >  IMPRESSION:    Note, there is extensive motion artifact on theFDG-PET scan and repeat   imaging is recommended.    Abnormal hypometabolism particularly pronounced in the frontotemporal   regions, with accompanying volume loss on structural imaging, findings   consistent with underlying neurodegenerative disease, pattern most   suggestive of frontotemporal lobar degeneration (FTLD).    Note, given prominent atrophy/hypometabolism of the caudate nuclei and   patient's young age, [place]'s disease (HD), should also be considered   in the proper clinical setting.    TAU-PET would be helpful for further assessment.    Correlation with clinical exam, CSF sampling, and neuropsychological   assessment is advised.    --- End of Report ---    < end of copied text >        < from: CT Head No Cont (03.27.24 @ 18:01) >  IMPRESSION:   No acute abnormality.    Frontal and temporal lobe atrophy.    < end of copied text >

## 2024-03-31 NOTE — PROGRESS NOTE ADULT - ATTENDING COMMENTS
Unable to tolerate MRI last night - will defer for now since she would need to be under general anesthesia to have it performed and it is not likely to provide a definitive diagnosis  f/u CSF studies - Tau, 14-3-3 etc  f/u genetic testing - FTD panel, HTT   SW to try to arrange home services tomorrow, likely d/c home then will move back to [place] to be closer to family  discussed with the  and father at length

## 2024-03-31 NOTE — PROGRESS NOTE ADULT - SUBJECTIVE AND OBJECTIVE BOX
Patient is a 38y old  Female who presents with a chief complaint of Altered mental status and progressive cognitive impairment (31 Mar 2024 09:37)    Interim History: Difficulty falling asleep, went to sleep around 1am. Endorsed feeling very tired this morning.     MEDICATIONS  (STANDING):  ARIPiprazole 2.5 milliGRAM(s) Oral at bedtime  clonazePAM Oral Disintegrating Tablet 0.25 milliGRAM(s) Oral two times a day  enoxaparin Injectable 40 milliGRAM(s) SubCutaneous every 24 hours  fluvoxaMINE 150 milliGRAM(s) Oral at bedtime  influenza   Vaccine 0.5 milliLiter(s) IntraMuscular once  memantine 10 milliGRAM(s) Oral two times a day    MEDICATIONS  (PRN):  OLANZapine Disintegrating Tablet 5 milliGRAM(s) Oral daily PRN Severe agitation    Allergies    No Known Allergies    Intolerances        Vital Signs Last 24 Hrs  T(C): 36.9 (30 Mar 2024 21:30), Max: 36.9 (30 Mar 2024 21:30)  T(F): 98.5 (30 Mar 2024 21:30), Max: 98.5 (30 Mar 2024 21:30)  HR: 95 (30 Mar 2024 21:30) (93 - 95)  BP: 97/67 (30 Mar 2024 21:30) (95/66 - 97/67)  BP(mean): --  RR: 17 (30 Mar 2024 21:30) (17 - 17)  SpO2: 98% (30 Mar 2024 21:30) (98% - 98%)    Parameters below as of 30 Mar 2024 21:30  Patient On (Oxygen Delivery Method): room air      NEURO EXAM  Mental status: Drowsy. Oriented to self, location, year.  Language:  Naming intact. Able to follow commands. Perseveration  Cranial nerves: Pupils equally round and reactive to light, no nystagmus, extraocular muscles intact, V1 through V3 intact bilaterally and symmetric, face symmetric, hearing intact to finger rub. Shoulder strength intact. Jaw strength intact. Palate is elevated and symmetric with tongue midline exhibiting normal movements. Intact Blink to threat  Motor: Moves UE and LE spontaneously. Tone is normal   Sensation: Intact to noxious  Coordination: No tremor at rest or abnormal movements.   Reflexes: 2+ biceps 2+patellar. Downgoing toes. +palmaromental (L), +Glabellar. No grasp.   Gait: Deferred      Lab Results:

## 2024-03-31 NOTE — PROGRESS NOTE ADULT - ASSESSMENT
38-year-old female with a history of alcohol use disorder (sober for 1 year), OCD, depression, anxiety with 2 prior psychiatric hospitalizations in the past year, currently taking Abilify 5 mg, Klonopin 0.25 mg, fluvoxamine 150 mg and memantine 10 mg. Patient BIBEMS to [facility] for altered mental status in the setting of stepwise cognitive decline over the course of several years, more prominent over the past few weeks. CTH concerning for FT atrophy, PET scan demonstrated reduced metabolism at b/l caudate, frontotemporal (worse on the right). 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 [place] related). Given her PET findings and severe agitation when MRI was attempted will hold off on MRI as likely would not  (PET was very informative). Family is planning on taking patient home with  for a few weeks (with visiting nurse etc) ultimately moving back to [place].      #Progressive cognitive impairment.   PLAN:  - CT Head on 3/26 shows atrophy of the anterior frontal and temporal lobes   - Psych f/o with patient on 3/28  - PET imaging done on 3/29 with frontotemporal hypometabolism, as well as caudate hypometabolism   - MRI w/contrast: patient was restless in the MRI and hence had to be cancelled  · On home med: continue memantine 10 mg BID  - Genetic testing - frontotemporal dementia panel and HTT gene sent  - Lumbar puncture done on 3/28. Neg results so far, pending further results  - f/u results of porphyria workup    #  Psychosis, unspecified psychosis type.   PLAN:  - If severe agitation, olanzapine 5 mg PRN      #OCD (obsessive compulsive disorder).   c/w home Abilify 2.5 mg QHS.    #Depression/anxiety   -cw Fluvoxamine 150 mg QHS and Klonopin 0.25 mg BID.    #History of alcohol use disorder.   no active issue    # Preventive measure.   Regular diet  Replete electrolytes  DVT prophylaxis  Full code  Dispo: [facility]--> home with visiting services (potential dc tomorrow or tuesday home with home services)

## 2024-04-01 ENCOUNTER — TRANSCRIPTION ENCOUNTER (OUTPATIENT)
Age: 39
End: 2024-04-01

## 2024-04-01 VITALS
OXYGEN SATURATION: 97 % | TEMPERATURE: 98 F | RESPIRATION RATE: 16 BRPM | SYSTOLIC BLOOD PRESSURE: 100 MMHG | DIASTOLIC BLOOD PRESSURE: 66 MMHG | HEART RATE: 86 BPM

## 2024-04-01 LAB
ACE SERPL-CCNC: 49 U/L — SIGNIFICANT CHANGE UP (ref 14–82)
ALBUMIN CSF-MCNC: 21.5 MG/DL — SIGNIFICANT CHANGE UP (ref 14–25)
ALBUMIN SERPL ELPH-MCNC: 3443 MG/DL — LOW (ref 3500–5200)
EBV PCR: SIGNIFICANT CHANGE UP IU/ML
IGG CSF-MCNC: 3.1 MG/DL — SIGNIFICANT CHANGE UP
IGG FLD-MCNC: 951 MG/DL — SIGNIFICANT CHANGE UP (ref 610–1660)
IGG SYNTH RATE SER+CSF CALC-MRATE: -1.3 MG/DAY — SIGNIFICANT CHANGE UP
IGG/ALB CLEAR SER+CSF-RTO: 0.5 — SIGNIFICANT CHANGE UP
IGG/ALB CSF: 0.14 RATIO — SIGNIFICANT CHANGE UP
IGG/ALB SER: 0.28 RATIO — SIGNIFICANT CHANGE UP
INNER EAR 68KD AB FLD QL: <1.5 U/L — SIGNIFICANT CHANGE UP (ref 0–2.5)
VDRL CSF-TITR: SIGNIFICANT CHANGE UP
VIT B1 SERPL-MCNC: 121.7 NMOL/L — SIGNIFICANT CHANGE UP (ref 66.5–200)

## 2024-04-01 PROCEDURE — 80053 COMPREHEN METABOLIC PANEL: CPT

## 2024-04-01 PROCEDURE — 81025 URINE PREGNANCY TEST: CPT

## 2024-04-01 PROCEDURE — 83615 LACTATE (LD) (LDH) ENZYME: CPT

## 2024-04-01 PROCEDURE — 99239 HOSP IP/OBS DSCHRG MGMT >30: CPT

## 2024-04-01 PROCEDURE — 86780 TREPONEMA PALLIDUM: CPT

## 2024-04-01 PROCEDURE — 84311 SPECTROPHOTOMETRY: CPT

## 2024-04-01 PROCEDURE — A9552: CPT

## 2024-04-01 PROCEDURE — 85610 PROTHROMBIN TIME: CPT

## 2024-04-01 PROCEDURE — 96372 THER/PROPH/DIAG INJ SC/IM: CPT | Mod: XU

## 2024-04-01 PROCEDURE — 86255 FLUORESCENT ANTIBODY SCREEN: CPT

## 2024-04-01 PROCEDURE — 70450 CT HEAD/BRAIN W/O DYE: CPT | Mod: MC

## 2024-04-01 PROCEDURE — 97129 THER IVNTJ 1ST 15 MIN: CPT

## 2024-04-01 PROCEDURE — 82390 ASSAY OF CERULOPLASMIN: CPT

## 2024-04-01 PROCEDURE — 80307 DRUG TEST PRSMV CHEM ANLYZR: CPT

## 2024-04-01 PROCEDURE — 81271 HTT GENE DETC ABNOR ALLELES: CPT

## 2024-04-01 PROCEDURE — 97116 GAIT TRAINING THERAPY: CPT

## 2024-04-01 PROCEDURE — 87102 FUNGUS ISOLATION CULTURE: CPT

## 2024-04-01 PROCEDURE — 84393 TAU PHOSPHORYLATED EA: CPT

## 2024-04-01 PROCEDURE — 82784 ASSAY IGA/IGD/IGG/IGM EACH: CPT

## 2024-04-01 PROCEDURE — 87385 HISTOPLASMA CAPSUL AG IA: CPT

## 2024-04-01 PROCEDURE — 85025 COMPLETE CBC W/AUTO DIFF WBC: CPT

## 2024-04-01 PROCEDURE — 83550 IRON BINDING TEST: CPT

## 2024-04-01 PROCEDURE — 82962 GLUCOSE BLOOD TEST: CPT

## 2024-04-01 PROCEDURE — 83916 OLIGOCLONAL BANDS: CPT

## 2024-04-01 PROCEDURE — 82607 VITAMIN B-12: CPT

## 2024-04-01 PROCEDURE — 86140 C-REACTIVE PROTEIN: CPT

## 2024-04-01 PROCEDURE — 97530 THERAPEUTIC ACTIVITIES: CPT

## 2024-04-01 PROCEDURE — 86403 PARTICLE AGGLUT ANTBDY SCRN: CPT

## 2024-04-01 PROCEDURE — 83540 ASSAY OF IRON: CPT

## 2024-04-01 PROCEDURE — 84157 ASSAY OF PROTEIN OTHER: CPT

## 2024-04-01 PROCEDURE — 87070 CULTURE OTHR SPECIMN AEROBIC: CPT

## 2024-04-01 PROCEDURE — 82040 ASSAY OF SERUM ALBUMIN: CPT

## 2024-04-01 PROCEDURE — 82542 COL CHROMOTOGRAPHY QUAL/QUAN: CPT

## 2024-04-01 PROCEDURE — 95700 EEG CONT REC W/VID EEG TECH: CPT

## 2024-04-01 PROCEDURE — 87799 DETECT AGENT NOS DNA QUANT: CPT

## 2024-04-01 PROCEDURE — 99232 SBSQ HOSP IP/OBS MODERATE 35: CPT | Mod: GC

## 2024-04-01 PROCEDURE — 82164 ANGIOTENSIN I ENZYME TEST: CPT

## 2024-04-01 PROCEDURE — 81001 URINALYSIS AUTO W/SCOPE: CPT

## 2024-04-01 PROCEDURE — 86341 ISLET CELL ANTIBODY: CPT

## 2024-04-01 PROCEDURE — 86592 SYPHILIS TEST NON-TREP QUAL: CPT

## 2024-04-01 PROCEDURE — 97165 OT EVAL LOW COMPLEX 30 MIN: CPT

## 2024-04-01 PROCEDURE — 87483 CNS DNA AMP PROBE TYPE 12-25: CPT

## 2024-04-01 PROCEDURE — 86789 WEST NILE VIRUS ANTIBODY: CPT

## 2024-04-01 PROCEDURE — 83605 ASSAY OF LACTIC ACID: CPT

## 2024-04-01 PROCEDURE — 87476 LYME DIS DNA AMP PROBE: CPT

## 2024-04-01 PROCEDURE — 89051 BODY FLUID CELL COUNT: CPT

## 2024-04-01 PROCEDURE — 83520 IMMUNOASSAY QUANT NOS NONAB: CPT

## 2024-04-01 PROCEDURE — 87205 SMEAR GRAM STAIN: CPT

## 2024-04-01 PROCEDURE — 95705 EEG W/O VID 2-12 HR UNMNTR: CPT

## 2024-04-01 PROCEDURE — 86362 MOG-IGG1 ANTB CBA EACH: CPT

## 2024-04-01 PROCEDURE — 84425 ASSAY OF VITAMIN B-1: CPT

## 2024-04-01 PROCEDURE — 80061 LIPID PANEL: CPT

## 2024-04-01 PROCEDURE — 86617 LYME DISEASE ANTIBODY: CPT

## 2024-04-01 PROCEDURE — 83873 ASSAY OF CSF PROTEIN: CPT

## 2024-04-01 PROCEDURE — 0035U NEURO CSF PRION PRTN QUAL: CPT

## 2024-04-01 PROCEDURE — 78608 BRAIN IMAGING (PET): CPT | Mod: MC

## 2024-04-01 PROCEDURE — 87389 HIV-1 AG W/HIV-1&-2 AB AG IA: CPT

## 2024-04-01 PROCEDURE — 82042 OTHER SOURCE ALBUMIN QUAN EA: CPT

## 2024-04-01 PROCEDURE — 82746 ASSAY OF FOLIC ACID SERUM: CPT

## 2024-04-01 PROCEDURE — 99285 EMERGENCY DEPT VISIT HI MDM: CPT

## 2024-04-01 PROCEDURE — 82803 BLOOD GASES ANY COMBINATION: CPT

## 2024-04-01 PROCEDURE — 84394 TOTAL TAU: CPT

## 2024-04-01 PROCEDURE — 85027 COMPLETE CBC AUTOMATED: CPT

## 2024-04-01 PROCEDURE — 99231 SBSQ HOSP IP/OBS SF/LOW 25: CPT

## 2024-04-01 PROCEDURE — 87637 SARSCOV2&INF A&B&RSV AMP PRB: CPT

## 2024-04-01 PROCEDURE — 83036 HEMOGLOBIN GLYCOSYLATED A1C: CPT

## 2024-04-01 PROCEDURE — 84443 ASSAY THYROID STIM HORMONE: CPT

## 2024-04-01 PROCEDURE — 82945 GLUCOSE OTHER FLUID: CPT

## 2024-04-01 PROCEDURE — 82525 ASSAY OF COPPER: CPT

## 2024-04-01 PROCEDURE — 97161 PT EVAL LOW COMPLEX 20 MIN: CPT

## 2024-04-01 PROCEDURE — 36415 COLL VENOUS BLD VENIPUNCTURE: CPT

## 2024-04-01 PROCEDURE — 86788 WEST NILE VIRUS AB IGM: CPT

## 2024-04-01 PROCEDURE — 96374 THER/PROPH/DIAG INJ IV PUSH: CPT

## 2024-04-01 PROCEDURE — 95708 EEG WO VID EA 12-26HR UNMNTR: CPT

## 2024-04-01 PROCEDURE — 85730 THROMBOPLASTIN TIME PARTIAL: CPT

## 2024-04-01 RX ADMIN — MEMANTINE HYDROCHLORIDE 10 MILLIGRAM(S): 10 TABLET ORAL at 09:40

## 2024-04-01 RX ADMIN — Medication 0.25 MILLIGRAM(S): at 09:40

## 2024-04-01 NOTE — BH CONSULTATION LIAISON PROGRESS NOTE - MSE UNSTRUCTURED FT
Young female appearing stated age, lying in bed with eyes closed appearing asleep, poorly/distantly related, speech is slowed, psychomotor slowing is present, pt is oriented to self and place but not to date, mood is "tired", affect is flat, thought process concrete, thought content minimally assessed, insight and judgment are poor.

## 2024-04-01 NOTE — BH CONSULTATION LIAISON PROGRESS NOTE - NSBHCHARTREVIEWVS_PSY_A_CORE FT
Vital Signs Last 24 Hrs  T(C): 36.3 (29 Mar 2024 06:37), Max: 36.7 (28 Mar 2024 21:20)  T(F): 97.4 (29 Mar 2024 06:37), Max: 98.1 (28 Mar 2024 21:20)  HR: 86 (29 Mar 2024 06:37) (86 - 99)  BP: 95/66 (29 Mar 2024 06:37) (95/66 - 121/78)  BP(mean): 92 (28 Mar 2024 21:20) (92 - 92)  RR: 18 (29 Mar 2024 06:37) (17 - 18)  SpO2: 95% (29 Mar 2024 06:37) (95% - 97%)    Parameters below as of 29 Mar 2024 06:37  Patient On (Oxygen Delivery Method): room air    
Vital Signs Last 24 Hrs  T(C): 36.5 (01 Apr 2024 05:52), Max: 36.8 (31 Mar 2024 22:06)  T(F): 97.7 (01 Apr 2024 05:52), Max: 98.2 (31 Mar 2024 22:06)  HR: 83 (01 Apr 2024 05:52) (83 - 98)  BP: 94/58 (01 Apr 2024 05:52) (94/58 - 99/69)  BP(mean): --  RR: 18 (01 Apr 2024 05:52) (18 - 18)  SpO2: 97% (01 Apr 2024 05:52) (96% - 97%)    Parameters below as of 01 Apr 2024 05:52  Patient On (Oxygen Delivery Method): room air    
Vital Signs Last 24 Hrs  T(C): 36.5 (28 Mar 2024 11:53), Max: 36.8 (27 Mar 2024 21:37)  T(F): 97.7 (28 Mar 2024 11:53), Max: 98.3 (28 Mar 2024 06:30)  HR: 85 (28 Mar 2024 11:53) (69 - 106)  BP: 95/67 (28 Mar 2024 11:53) (93/61 - 113/77)  BP(mean): 72 (27 Mar 2024 23:10) (72 - 72)  RR: 18 (28 Mar 2024 11:53) (16 - 18)  SpO2: 96% (28 Mar 2024 11:53) (94% - 97%)    Parameters below as of 28 Mar 2024 11:53  Patient On (Oxygen Delivery Method): room air

## 2024-04-01 NOTE — PROGRESS NOTE ADULT - ASSESSMENT
38-year-old female with a history of alcohol use disorder (sober for 1 year), OCD, depression, anxiety with 2 prior psychiatric hospitalizations in the past year, currently taking Abilify 5 mg, Klonopin 0.25 mg, fluvoxamine 150 mg and memantine 10 mg. Patient BIBEMS to [facility] for altered mental status in the setting of stepwise cognitive decline over the course of several years, more prominent over the past few weeks. CTH concerning for FT atrophy, PET scan demonstrated reduced metabolism at b/l caudate, frontotemporal (worse on the right). 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 hunt[place] related). Given her PET findings and severe agitation when MRI was attempted will hold off on MRI as likely would not  (PET was very informative). Family is planning on taking patient home with  for a few weeks (with visiting nurse etc) ultimately moving back to [place].      #Progressive cognitive impairment.   PLAN:  - CT Head on 3/26 shows atrophy of the anterior frontal and temporal lobes   - Psych f/o with patient on 3/28  - PET imaging done on 3/29 with frontotemporal hypometabolism, as well as caudate hypometabolism   - MRI w/contrast: patient was restless in the MRI and hence had to be cancelled  · On home med: continue memantine 10 mg BID  - Genetic testing - frontotemporal dementia panel and HTT gene sent  - Lumbar puncture done on 3/28. Neg results so far, pending further results  - f/u results of porphyria workup    #  Psychosis, unspecified psychosis type.   PLAN:  - If severe agitation, olanzapine 5 mg PRN      #OCD (obsessive compulsive disorder).   c/w home Abilify 2.5 mg QHS.    #Depression/anxiety   -cw Fluvoxamine 150 mg QHS and Klonopin 0.25 mg BID.    #History of alcohol use disorder.   no active issue    # Preventive measure.   Regular diet  Replete electrolytes  DVT prophylaxis  Full code  Dispo: RMF--> home with visiting services (potential dc tomorrow or tuesday home with home services)   38-year-old female with a history of alcohol use disorder (sober for 1 year), OCD, depression, anxiety with 2 prior psychiatric hospitalizations in the past year, currently taking Abilify 5 mg, Klonopin 0.25 mg, fluvoxamine 150 mg and memantine 10 mg. Patient BIBEMS to [facility] for altered mental status in the setting of stepwise cognitive decline over the course of several years, more prominent over the past few weeks. CTH concerning for FT atrophy, PET scan demonstrated reduced metabolism at b/l caudate, frontotemporal (worse on the right). 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 hunt[place] related). Given her PET findings and severe agitation when MRI was attempted will hold off on MRI as likely would not  (PET was very informative). Family is planning on taking patient home with  for a few weeks (with visiting nurse etc) ultimately moving back to [place].      #Progressive cognitive impairment.   PLAN:  - CT Head on 3/26 shows atrophy of the anterior frontal and temporal lobes    - CBC, BMP, Urinalysis, and toxicology screen on 3/27 and 3/28 generally unremarkable  - Psych f/o with patient on 3/28  - PET imaging done on 3/29 with frontotemporal hypometabolism, as well as caudate hypometabolism   - MRI w/contrast: patient was restless in the MRI and hence had to be cancelled  · On home med: continue memantine 10 mg BID  - Genetic testing - frontotemporal dementia panel and HTT gene sent  - Lumbar puncture done on 3/28. Neg results so far, pending further results  - f/u results of porphyria workup  - recommended following with a neuropsychiatrist outpatient     #  Psychosis, unspecified psychosis type.   PLAN:  - If severe agitation, olanzapine 5 mg PRN      #OCD (obsessive compulsive disorder).   c/w home Abilify 2.5 mg QHS.    #Depression/anxiety   -cw Fluvoxamine 150 mg QHS and Klonopin 0.25 mg BID.    #History of alcohol use disorder.   no active issue    # Preventive measure.   Regular diet  Replete electrolytes  DVT prophylaxis  Full code  Dispo: RMF--> home with visiting services (potential dc tomorrow or tuesday home with home services)   38-year-old female with a history of alcohol use disorder (sober for 1 year), OCD, depression, anxiety with 2 prior psychiatric hospitalizations in the past year, currently taking Abilify 5 mg, Klonopin 0.25 mg, fluvoxamine 150 mg and memantine 10 mg. Patient BIBEMS to [facility] for altered mental status in the setting of stepwise cognitive decline over the course of several years, more prominent over the past few weeks. CTH concerning for FT atrophy, PET scan demonstrated reduced metabolism at b/l caudate, frontotemporal (worse on the right). 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 hunt[place] related). Given her PET findings and severe agitation when MRI was attempted will hold off on MRI as likely would not  (PET was very informative). Family is planning on taking patient home with  for a few weeks (with visiting nurse etc) ultimately moving back to [place].      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 Huntingtons  PLAN:  - CT Head on 3/26 shows atrophy of the anterior frontal and temporal lobes    - CBC, BMP, Urinalysis, and toxicology screen on 3/27 and 3/28 generally unremarkable  - Psych f/o with patient on 3/28  - PET imaging done on 3/29 with frontotemporal hypometabolism, as well as caudate hypometabolism   - MRI w/contrast: patient was restless in the MRI and hence had to be cancelled  · On home med: continue memantine 10 mg BID  - Genetic testing - frontotemporal dementia panel and HTT gene sent  - Lumbar puncture done on 3/28. Neg results so far, pending further results  - f/u results of porphyria workup  - recommended following with a neuropsychiatrist outpatient Dr. Helder Dang    #  Psychosis, unspecified psychosis type.   PLAN:  - If severe agitation, olanzapine 5 mg PRN      #OCD (obsessive compulsive disorder).   c/w home Abilify 2.5 mg QHS.    #Depression/anxiety   -cw Fluvoxamine 150 mg QHS and Klonopin 0.25 mg BID.    #History of alcohol use disorder.   no active issue    # Preventive measure.   Regular diet  Replete electrolytes  DVT prophylaxis  Full code  Dispo: RMF--> dc home today. Pt insurance doesnt cover VNS, CM provided recs for HHA to . 38-year-old female with a history of alcohol use disorder (sober for 1 year), OCD, depression, anxiety with 2 prior psychiatric hospitalizations in the past year, currently taking Abilify 5 mg, Klonopin 0.25 mg, fluvoxamine 150 mg and memantine 10 mg. Patient BIBEMS to [facility] for altered mental status in the setting of stepwise cognitive decline over the course of several years, more prominent over the past few weeks. CTH concerning for FT atrophy, PET scan demonstrated reduced metabolism at b/l caudate, frontotemporal (worse on the right). 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 hunt[place] related). Given her PET findings and severe agitation when MRI was attempted will hold off on MRI as likely would not  (PET was very informative). Family is planning on taking patient home with  for a few weeks (with visiting nurse etc) ultimately moving back to [place].      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 Huntingtons  PLAN:  - CT Head on 3/26 shows atrophy of the anterior frontal and temporal lobes    - CBC, BMP, Urinalysis, and toxicology screen on 3/27 and 3/28 generally unremarkable  - Psych f/o with patient on 3/28  - PET imaging done on 3/29 with frontotemporal hypometabolism, as well as caudate hypometabolism   - MRI w/contrast: patient was restless in the MRI and hence had to be cancelled  · On home med: continue memantine 10 mg BID  - Genetic testing - frontotemporal dementia panel and HTT gene sent  - Lumbar puncture done on 3/28. Neg results so far, pending further results  - f/u results of porphyria workup  - recommended following with a neuropsychiatrist outpatient Dr. Helder Dang or Diane Reyes    #  Psychosis, unspecified psychosis type.   PLAN:  - If severe agitation, olanzapine 5 mg PRN      #OCD (obsessive compulsive disorder).   c/w home Abilify 2.5 mg QHS.    #Depression/anxiety   -cw Fluvoxamine 150 mg QHS and Klonopin 0.25 mg BID.    #History of alcohol use disorder.   no active issue    # Preventive measure.   Regular diet  Replete electrolytes  DVT prophylaxis  Full code  Dispo: RMF--> dc home today. Pt insurance doesnt cover VNS, CM provided recs for HHA to .

## 2024-04-01 NOTE — DISCHARGE NOTE PROVIDER - NSDCMRMEDTOKEN_GEN_ALL_CORE_FT
Abilify 5 mg oral tablet: 0.5 tab(s) orally once a day (at bedtime)  fluvoxaMINE 150 mg oral capsule, extended release: 1 cap(s) orally once a day  KlonoPIN 0.25 mg oral tablet: 1 tab(s) orally 2 times a day  memantine 10 mg oral tablet: 1 tab(s) orally 2 times a day

## 2024-04-01 NOTE — DISCHARGE NOTE PROVIDER - NSDCCPCAREPLAN_GEN_ALL_CORE_FT
PRINCIPAL DISCHARGE DIAGNOSIS  Diagnosis: Altered mental status  Assessment and Plan of Treatment: You came in with altered mental status. Your scan showed a less volume in some parts of the brain which suggest you may have Frontotemporal dementia. You got spinal tap as well which didnt show any significant results so far. Genetic testing were sent which include diseases called frontotemporal dementia and Huntingtons disease. Please follow up with neuropsych Dr. Vani Dang for further management.      SECONDARY DISCHARGE DIAGNOSES  Diagnosis: Frontotemporal brain disease  Assessment and Plan of Treatment:

## 2024-04-01 NOTE — PROGRESS NOTE ADULT - ASSESSMENT
Neuro    38 year old female with a history of alcohol use disorder (sober for 1 year), OCD, depression, anxiety with 2 prior psychiatric hospitalizations in the past year, currently taking Abilify 5 mg, Klonopin 0.25 mg, fluvoxamine 150 mg and memantine 10 mg. Patient BIBEMS to [facility] for altered mental status in the setting of stepwise cognitive decline over the course of several years, more prominent over the past few weeks. CTH concerning for FT atrophy, PET scan demonstrated reduced metabolism at b/l caudate, frontotemporal (worse on the right). 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 hunt[place] related). Given her PET findings and severe agitation when MRI was attempted will hold off on MRI as likely would not  (PET was very informative). Family is planning on taking patient home with  for a few weeks (with visiting nurse etc) ultimately moving back to [place].    #Progressive cognitive impairment.   PLAN:  - CT Head on 3/26 shows atrophy of the anterior frontal and temporal lobes   - Psych f/o with patient on 3/28  - PET imaging done on 3/29 with frontotemporal hypometabolism, as well as caudate hypometabolism   - MRI w/contrast: patient was restless in the MRI and hence had to be cancelled  · On home med: continue memantine 10 mg BID  - Genetic testing - frontotemporal dementia panel and HTT gene sent  - Lumbar puncture done on 3/28. Neg results so far, pending further results  - f/u results of porphyria workup    #  Psychosis, unspecified psychosis type.   PLAN:  - If severe agitation, olanzapine 5 mg PRN      #OCD (obsessive compulsive disorder).   c/w home Abilify 2.5 mg QHS.    #Depression/anxiety   -cw Fluvoxamine 150 mg QHS and Klonopin 0.25 mg BID.    #History of alcohol use disorder.   no active issue    # Preventive measure.   Regular diet  Replete electrolytes  DVT prophylaxis  Full code  Dispo: RM--> home with visiting services (potential dc tomorrow or tuesday home with home services)

## 2024-04-01 NOTE — PROGRESS NOTE ADULT - ATTENDING COMMENTS
inpatient workup suggesting frontotemporal dementia, which was likely unmasked by COVID infection. unfortunately no intervention is possible for the underlying condition.  treatment is supportive and primarily focued on psychiatric symptoms caused by underlying degenerative disease    continue memtantine 10mg BID  appreciate psychiatry input, treatment currently targeting OCD spectrum symptoms and catatonia: continue Fluvoxamine 150 mg QHS, abilify 2.5mg QHS and Klonopin 0.25 mg BID.  suggested outpatient neuropsychiatry evaluation

## 2024-04-01 NOTE — PROGRESS NOTE ADULT - SUBJECTIVE AND OBJECTIVE BOX
Physical Medicine and Rehabilitation Progress Note :       Patient is a 38y old  Female who presents with a chief complaint of Altered mental status and progressive cognitive impairment (01 Apr 2024 13:03)      HPI:  38-year-old female with a history of alcohol use disorder (sober for 1 year), OCD, depression, anxiety with 2 prior psychiatric hospitalizations in the past year (follows with psychiatrist Dr. Hernandez, last visit decreased fluvoxamine and Abilify 1-2 weeks ago), currently taking Abilify 2.5 mg QHS, Klonopin 0.25 mg BID, fluvoxamine 150 mg QHS and memantine 10 mg BID. Patient BIBEMS to [facility] for altered mental status. Patient brought in from outpatient facility for inability or perform blood work. As per EMS, patient was too agitated to perform blood work. Patient was aaox4 but babbles words repeatedly and is unable to focus or cooperate with staff. Patient brought in for psych eval. Patient unable to give pertinent psych history.     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 In March and April 2023 patient was admitted for 1 month in 2 different mental health clinics. Although patient has been sober for a year her functional status continues to decline, with a 35 pound weight gain in the past year, poor hygiene, and sleeping most of the day every day. Her  describes how patient becomes more childish, changing her voice and behaving inappropriately. Posteriorly, during the nexts months declining gradually in cognition, activity, memory, personality and calculation, and being more apathetic. In January 2024, patient got worse in a stepwise manner and started to walk worse and fall more often. Patient has been unable to work for at least 2 years.  She is currently under the care of a psychotherapist who recommended that she report to a health care facility for further evaluation and likely admission as patient has been unresponsive to outpatient therapy and medications.  Per  patient has not been coughing or indicating pain of any kind.  No recent illness.     Stepwise cognitive decline over the course of several years, more prominent over the past few weeks, unresponsive to pharmacotherapy/psychiatric intervention in outpt setting, Psych following at [facility].    In [facility] ED:  - Vitals: Normal  - Labs: Normal  - UA: cloudy urine  - Utox: + BZD  - CT head wo: frontotemporal atrophy not characteristic of age.     Transferred to [facility] after discussion with neurology attending Dr. Mccrary for further management.  (27 Mar 2024 22:33)                Vital Signs Last 24 Hrs  T(C): 36.5 (01 Apr 2024 05:52), Max: 36.8 (31 Mar 2024 22:06)  T(F): 97.7 (01 Apr 2024 05:52), Max: 98.2 (31 Mar 2024 22:06)  HR: 83 (01 Apr 2024 05:52) (83 - 98)  BP: 94/58 (01 Apr 2024 05:52) (94/58 - 99/69)  BP(mean): --  RR: 18 (01 Apr 2024 05:52) (18 - 18)  SpO2: 97% (01 Apr 2024 05:52) (96% - 97%)    Parameters below as of 01 Apr 2024 05:52  Patient On (Oxygen Delivery Method): room air        MEDICATIONS  (STANDING):  ARIPiprazole 2.5 milliGRAM(s) Oral at bedtime  clonazePAM Oral Disintegrating Tablet 0.25 milliGRAM(s) Oral two times a day  enoxaparin Injectable 40 milliGRAM(s) SubCutaneous every 24 hours  fluvoxaMINE 150 milliGRAM(s) Oral at bedtime  influenza   Vaccine 0.5 milliLiter(s) IntraMuscular once  LORazepam   Injectable 2 milliGRAM(s) IV Push once  memantine 10 milliGRAM(s) Oral two times a day    MEDICATIONS  (PRN):  OLANZapine Disintegrating Tablet 5 milliGRAM(s) Oral daily PRN Severe agitation      T(C): 36.5 (04-01-24 @ 05:52), Max: 36.8 (03-31-24 @ 22:06)  HR: 83 (04-01-24 @ 05:52) (83 - 98)  BP: 94/58 (04-01-24 @ 05:52) (94/58 - 99/69)  RR: 18 (04-01-24 @ 05:52) (18 - 18)  SpO2: 97% (04-01-24 @ 05:52) (96% - 97%)        Physical Exam:       General: NAD, resting in bed, sleepy    HEENT: NC/AT; PERRL, clear conjunctiva    Neck: supple    Respiratory: CTA b/l    Cardiovascular: +S1/S2; RRR    Abdomen: soft, NT/ND; +BS x4    Extremities: 2+ peripheral pulses b/l; no LE edema    Skin: normal color and turgor; no rash    Neuro:    Mental status: Awake, alert and oriented x3.  Recent and remote memory intact.  Naming, repetition and comprehension intact.  Attention/concentration intact.  No dysarthria, no aphasia.  Fund of knowledge appropriate.     Cranial nerves: Pupils equally round and reactive to light, visual fields full, no nystagmus, extraocular muscles intact, V1 through V3 intact bilaterally and symmetric, face symmetric, hearing intact to finger rub, palate elevation symmetric, tongue was midline    Motor:  MRC grading 5/5 b/l UE/LE.   strength 5/5.  Normal tone and bulk.  No abnormal movements      Sensation: Intact to light touch, proprioception, and pinprick    Coordination: No dysmetria on finger-to-nose and heel-to-shin.  No dysdiadokinesia    Reflexes: 2+ in bilateral UE/LE, downgoing toes bilaterally. (-) [place]      Functional Status Assessment :         Pain Assessment/Number Scale (0-10) Adult  Presence of Pain: denies pain/discomfort (Rating = 0)  Pain Rating (0-10): Rest: 0 (no pain/absence of nonverbal indicators of pain)  Pain Rating (0-10): Activity: 0 (no pain/absence of nonverbal indicators of pain)    Safety      [facility] Functional Assessment: Basic Mobility  Type of Assessment: Daily assessment  Turning from your back to your side while in a flat bed without using bedrails?: 4 = No assist / stand by assistance  Moving from lying on your back to sitting on the flat side of a flat bed without using bedrails?: 4 = No assist / stand by assistance  Moving to and from a bed to a chair (including a wheelchair)?: 4 = No assist / stand by assistance  Standing up from a chair using your arms (e.g. wheelchair or bedside chair)?: 4 = No assist / stand by assistance  Walking in hospital room?: 3 = A little assistance  Climbing 3-5 steps with a railing?: 3-calculated by average   Score: 22   Row Comment: Ask the patient "How much help from another person do you currently need? (If the patient hasn't done an activity recently, how much help from another person do you think he/she needs if he/she tried?)    Therapeutic Interventions      Bed Mobility  Bed Mobility Training Rehab Potential: good, to achieve stated therapy goals  Bed Mobility Training Symptoms Noted During/After Treatment: none  Bed Mobility Training Rolling/Turning: supervsion  Bed Mobility Training Scooting: supervsion  Bed Mobility Training Sit-to-Supine: supervsion  Bed Mobility Training Limitations: cognitive, decreased safety awareness    Sit-Stand Transfer Training  Transfer Training Sit-to-Stand Transfer: supervsion  Transfer Training Stand-to-Sit Transfer: supervsion  Sit-to-Stand Transfer Training Transfer Safety Analysis: decreased strength    Gait Training  Gait Training: contact guard;  100 feet  Gait Analysis: decreased whit;  increased time in double stance;  decreased strength;  impaired vision                        PM&R Impression : as above    Current disposition plan recommendation :     - d/c home with home physical therapy    - family assist

## 2024-04-01 NOTE — PROGRESS NOTE ADULT - ASSESSMENT
38 year old woman with hx of alcohol use disorder (sober x 1 year), carries diagnoses of OCD, depression, anxiety, with 2 prior psychiatric hospitalizations in the past year. Presented with worsening of functional status and behavioral disturbance     # Progressive cognitive impairment  # Suspected frontotemporal dementia   - workup per primary team   PET CT head consistent with neurodegenerative disease, suggestive of fronto-temporal lobe dementia.  [ ] f/u CSF studies     # Anxiety   # Depression   # Obsessive Compulsive Disorder   behavioral health following   On home abilify 2.5mg qhs, fluvoxamine 150mg qHS , klonopin 0.25 BID     DVT ppx - lovenox

## 2024-04-01 NOTE — DISCHARGE NOTE PROVIDER - PROVIDER TOKENS
FREE:[LAST:[[place]],FIRST:[Jason],PHONE:[(   )    -],FAX:[(   )    -],ADDRESS:[Psychiatry],FOLLOWUP:[1 week],ESTABLISHEDPATIENT:[T]],FREE:[LAST:[Gurin],FIRST:[Candace],PHONE:[(   )    -],FAX:[(   )    -],ADDRESS:[Neuropsychiatry],FOLLOWUP:[1 week],ESTABLISHEDPATIENT:[T]]

## 2024-04-01 NOTE — DISCHARGE NOTE PROVIDER - HOSPITAL COURSE
HPI:  38-year-old female with a history of alcohol use disorder (sober for 1 year), OCD, depression, anxiety with 2 prior psychiatric hospitalizations in the past year (follows with psychiatrist Dr. Hernandez, last visit decreased fluvoxamine and Abilify 1-2 weeks ago), currently taking Abilify 2.5 mg QHS, Klonopin 0.25 mg BID, fluvoxamine 150 mg QHS and memantine 10 mg BID. Patient BIBEMS to [facility] for altered mental status. Patient brought in from outpatient facility for inability or perform blood work. As per EMS, patient was too agitated to perform blood work. Patient was aaox4 but babbles words repeatedly and is unable to focus or cooperate with staff. Patient brought in for psych eval. Patient unable to give pertinent psych history.     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 In March and April 2023 patient was admitted for 1 month in 2 different mental health clinics. Although patient has been sober for a year her functional status continues to decline, with a 35 pound weight gain in the past year, poor hygiene, and sleeping most of the day every day. Her  describes how patient becomes more childish, changing her voice and behaving inappropriately. Posteriorly, during the nexts months declining gradually in cognition, activity, memory, personality and calculation, and being more apathetic. In January 2024, patient got worse in a stepwise manner and started to walk worse and fall more often. Patient has been unable to work for at least 2 years.  She is currently under the care of a psychotherapist who recommended that she report to a health care facility for further evaluation and likely admission as patient has been unresponsive to outpatient therapy and medications.  Per  patient has not been coughing or indicating pain of any kind.  No recent illness.     Stepwise cognitive decline over the course of several years, more prominent over the past few weeks, unresponsive to pharmacotherapy/psychiatric intervention in outpt setting, Psych following at [facility].    In [facility] ED:  - Vitals: Normal  - Labs: Normal  - UA: cloudy urine  - Utox: + BZD  - CT head wo: frontotemporal atrophy not characteristic of age.     Transferred to [facility] after discussion with neurology attending Dr. Mccrary for further management.  (27 Mar 2024 22:33)    Hospital course:  Pt admitted f   HPI:  38-year-old female with a history of alcohol use disorder (sober for 1 year), OCD, depression, anxiety with 2 prior psychiatric hospitalizations in the past year (follows with psychiatrist Dr. Hernandez, last visit decreased fluvoxamine and Abilify 1-2 weeks ago), currently taking Abilify 2.5 mg QHS, Klonopin 0.25 mg BID, fluvoxamine 150 mg QHS and memantine 10 mg BID. Patient BIBEMS to [facility] for altered mental status. Patient brought in from outpatient facility for inability or perform blood work. As per EMS, patient was too agitated to perform blood work. Patient was aaox4 but babbles words repeatedly and is unable to focus or cooperate with staff. Patient brought in for psych eval. Patient unable to give pertinent psych history.     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 In March and April 2023 patient was admitted for 1 month in 2 different mental health clinics. Although patient has been sober for a year her functional status continues to decline, with a 35 pound weight gain in the past year, poor hygiene, and sleeping most of the day every day. Her  describes how patient becomes more childish, changing her voice and behaving inappropriately. Posteriorly, during the nexts months declining gradually in cognition, activity, memory, personality and calculation, and being more apathetic. In January 2024, patient got worse in a stepwise manner and started to walk worse and fall more often. Patient has been unable to work for at least 2 years.  She is currently under the care of a psychotherapist who recommended that she report to a health care facility for further evaluation and likely admission as patient has been unresponsive to outpatient therapy and medications.  Per  patient has not been coughing or indicating pain of any kind.  No recent illness.     Stepwise cognitive decline over the course of several years, more prominent over the past few weeks, unresponsive to pharmacotherapy/psychiatric intervention in outpt setting, Psych following at [facility].    In [facility] ED:  - Vitals: Normal  - Labs: Normal  - UA: cloudy urine  - Utox: + BZD  - CT head wo: frontotemporal atrophy not characteristic of age.     Transferred to [facility] after discussion with neurology attending Dr. Mccrary for further management.  (27 Mar 2024 22:33)    Hospital course:  Pt likely have catatonia. PET brain showed hypometabolism in fronto-temporal and caudate nuclei. LP done and results neg so far. No family hx of similar complaints. 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 Had COVID earlier this year and her condition got worse. She was also noticed on VEEG having R arm shaking which was not correlating with the EEG. EEG was neg. Psych was also following pt and recommended to get the same meds inpatient for now, and f/u her o/p psych. Suspect FT dementia vs. [place] given young age and scans results. Genetic testing for FT and [place] sent to [facility], Dr. Mccrary will get the results later. [place] analysis was also sent inpatient, results pending.     Discussed with  to follow up with neuropsych Dr. Vani Dang for further management. Family plan to take pt to [facility] where they have more family.     Stable for dc today.   HPI:  38-year-old female with a history of alcohol use disorder (sober for 1 year), OCD, depression, anxiety with 2 prior psychiatric hospitalizations in the past year (follows with psychiatrist Dr. Hernandez, last visit decreased fluvoxamine and Abilify 1-2 weeks ago), currently taking Abilify 2.5 mg QHS, Klonopin 0.25 mg BID, fluvoxamine 150 mg QHS and memantine 10 mg BID. Patient BIBEMS to [facility] for altered mental status. Patient brought in from outpatient facility for inability or perform blood work. As per EMS, patient was too agitated to perform blood work. Patient was aaox4 but babbles words repeatedly and is unable to focus or cooperate with staff. Patient brought in for psych eval. Patient unable to give pertinent psych history.     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 In March and April 2023 patient was admitted for 1 month in 2 different mental health clinics. Although patient has been sober for a year her functional status continues to decline, with a 35 pound weight gain in the past year, poor hygiene, and sleeping most of the day every day. Her  describes how patient becomes more childish, changing her voice and behaving inappropriately. Posteriorly, during the nexts months declining gradually in cognition, activity, memory, personality and calculation, and being more apathetic. In January 2024, patient got worse in a stepwise manner and started to walk worse and fall more often. Patient has been unable to work for at least 2 years.  She is currently under the care of a psychotherapist who recommended that she report to a health care facility for further evaluation and likely admission as patient has been unresponsive to outpatient therapy and medications.  Per  patient has not been coughing or indicating pain of any kind.  No recent illness.     Stepwise cognitive decline over the course of several years, more prominent over the past few weeks, unresponsive to pharmacotherapy/psychiatric intervention in outpt setting, Psych following at [facility].    In [facility] ED:  - Vitals: Normal  - Labs: Normal  - UA: cloudy urine  - Utox: + BZD  - CT head wo: frontotemporal atrophy not characteristic of age.     Transferred to [facility] after discussion with neurology attending Dr. Mccrary for further management.  (27 Mar 2024 22:33)    Hospital course:  Presenting symptoms consistent with catatonia. PET brain showed hypometabolism in fronto-temporal and caudate nuclei. LP done and results neg so far. No family hx of similar complaints. 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 Had COVID earlier this year and her condition got worse. She was also noticed on VEEG having R arm shaking which was not correlating with the EEG. EEG was neg. Psych was also following pt and recommended to get the same meds inpatient for now, and f/u her o/p psych. Suspect most likely frontotemporal dementia vs. [place] given young age and scan results. Might have been accelerated or unmasked by COVID infeciton. Genetic testing for FT and [place] sent to [facility], Dr. Mccrary will recieve these. [place] analysis was also sent inpatient, results pending.     Discussed with  to follow up with neuropsych Dr. Candace Dang or Dr Diane Reyes for further smyptomatic management. The family plans to take her to [place] where they have more family.     Stable for dc today.

## 2024-04-01 NOTE — PROGRESS NOTE ADULT - SUBJECTIVE AND OBJECTIVE BOX
Patient is a 38y old  Female who presents with a chief complaint of Altered mental status and progressive cognitive impairment     INTERVAL EVENTS:  arlene    SUBJECTIVE:  denies complaints. no change from yesterday. Rest of 12 point ROS negative, except where noted above.     Vital Signs Last 24 Hrs  T(C): 36.5 (01 Apr 2024 05:52), Max: 36.8 (31 Mar 2024 22:06)  T(F): 97.7 (01 Apr 2024 05:52), Max: 98.2 (31 Mar 2024 22:06)  HR: 83 (01 Apr 2024 05:52) (83 - 98)  BP: 94/58 (01 Apr 2024 05:52) (94/58 - 99/69)  BP(mean): --  RR: 18 (01 Apr 2024 05:52) (18 - 18)  SpO2: 97% (01 Apr 2024 05:52) (96% - 97%)    Parameters below as of 01 Apr 2024 05:52  Patient On (Oxygen Delivery Method): room air      General: NAD, resting in bed, sleepy  HEENT: NC/AT; PERRL, clear conjunctiva  Neck: supple  Respiratory: CTA b/l  Cardiovascular: +S1/S2; RRR  Abdomen: soft, NT/ND; +BS x4  Extremities: 2+ peripheral pulses b/l; no LE edema  Skin: normal color and turgor; no rash  Neurological: oriented to self, sleepy but arousable and follows commands.      no labs

## 2024-04-01 NOTE — DISCHARGE NOTE PROVIDER - ATTENDING DISCHARGE PHYSICAL EXAMINATION:
Mental status: Drowsy. Oriented to self, location, year.  Language:  Naming intact. Able to follow commands. + Perseveration  Cranial nerves: Pupils equally round and reactive to light, no nystagmus, extraocular muscles intact, V1 through V3 intact bilaterally and symmetric, face symmetric, hearing intact to finger rub. Shoulder strength intact. Jaw strength intact. Palate is elevated and symmetric with tongue midline exhibiting normal movements. Intact Blink to threat  Motor: Moves UE and LE spontaneously. Tone is normal   Sensation: Intact to noxious  Coordination: No tremor at rest or abnormal movements.   Reflexes: 2+ biceps 2+patellar. Downgoing toes. +palmaromental (L), +Glabellar. No grasp.   Gait: stable

## 2024-04-01 NOTE — BH CONSULTATION LIAISON PROGRESS NOTE - NSBHATTESTBILLING_PSY_A_CORE
73744-Txrvgkdqmr OBS or IP - low complexity OR 25-34 mins
99222-Initial OBS or IP - moderate complexity OR 55-74 mins

## 2024-04-01 NOTE — BH CONSULTATION LIAISON PROGRESS NOTE - NSICDXBHSECONDARYDX_PSY_ALL_CORE
OCD (obsessive compulsive disorder)   F42.9  History of alcohol use disorder   Z87.896  
OCD (obsessive compulsive disorder)   F42.9  History of alcohol use disorder   Z87.895

## 2024-04-01 NOTE — PROGRESS NOTE ADULT - PROVIDER SPECIALTY LIST ADULT
Internal Medicine
Rehab Medicine
Hospitalist
Neurology
Neurology
Rehab Medicine
Neurology

## 2024-04-01 NOTE — BH CONSULTATION LIAISON PROGRESS NOTE - MSE OPTIONS
Alert-The patient is alert, awake and responds to voice. The patient is oriented to time, place, and person. The triage nurse is able to obtain subjective information.
Unstructured MSE
Structured MSE

## 2024-04-01 NOTE — BH CONSULTATION LIAISON PROGRESS NOTE - CURRENT MEDICATION
MEDICATIONS  (STANDING):  ARIPiprazole 2.5 milliGRAM(s) Oral at bedtime  clonazePAM Oral Disintegrating Tablet 0.25 milliGRAM(s) Oral two times a day  enoxaparin Injectable 40 milliGRAM(s) SubCutaneous every 24 hours  fluvoxaMINE 150 milliGRAM(s) Oral at bedtime  influenza   Vaccine 0.5 milliLiter(s) IntraMuscular once  memantine 10 milliGRAM(s) Oral two times a day    MEDICATIONS  (PRN):  OLANZapine Disintegrating Tablet 5 milliGRAM(s) Oral daily PRN Severe agitation  
MEDICATIONS  (STANDING):  ARIPiprazole 2.5 milliGRAM(s) Oral at bedtime  clonazePAM Oral Disintegrating Tablet 0.25 milliGRAM(s) Oral two times a day  enoxaparin Injectable 40 milliGRAM(s) SubCutaneous every 24 hours  fluvoxaMINE 150 milliGRAM(s) Oral at bedtime  influenza   Vaccine 0.5 milliLiter(s) IntraMuscular once  LORazepam   Injectable 2 milliGRAM(s) IV Push once  memantine 10 milliGRAM(s) Oral two times a day    MEDICATIONS  (PRN):  OLANZapine Disintegrating Tablet 5 milliGRAM(s) Oral daily PRN Severe agitation  
MEDICATIONS  (STANDING):  ARIPiprazole 2.5 milliGRAM(s) Oral at bedtime  clonazePAM Oral Disintegrating Tablet 0.25 milliGRAM(s) Oral two times a day  fluvoxaMINE 150 milliGRAM(s) Oral at bedtime  influenza   Vaccine 0.5 milliLiter(s) IntraMuscular once  memantine 10 milliGRAM(s) Oral two times a day    MEDICATIONS  (PRN):  OLANZapine Disintegrating Tablet 5 milliGRAM(s) Oral daily PRN Severe agitation

## 2024-04-01 NOTE — PROGRESS NOTE ADULT - REASON FOR ADMISSION
Altered mental status and progressive cognitive impairment

## 2024-04-01 NOTE — BH CONSULTATION LIAISON PROGRESS NOTE - NSBHASSESSMENTFT_PSY_ALL_CORE
38-year-old female with a history of alcohol use disorder (sober for 1 year), OCD, depression, anxiety with 2 prior [facility] [place] [facility] [place]) in the past year (follows with psychiatrist Dr. Figueroa, last visit decreased fluvoxamine and Abilify 1-2 weeks ago), currently taking Abilify 2.5 mg QHS, Klonopin 0.25 mg BID, fluvoxamine 150 mg QHS and memantine 10 mg BID. Patient BIBEMS to [facility] for altered mental status. Patient brought in from outpatient facility for inability or perform blood work due to agitation.    On today's eval, pt appearing drowsy, unwilling/unable to answer most questions. Found to be disoriented to time, psychomotor slowed, slowed speech, indicating evidence of cognitive impairment.  Collateral from  confirms rapid development of OCD around [id number] and then heavy alcohol use around 2021 without any prior psychiatric history. Cognitive decline has occurred since 2022, with more rapid development in the last 6 months.  Imaging (CT head) showing frontal and temporal atrophy.  The above indicates likely a neurocognitive disorder, with psychiatric symptoms stemming from an organic cause.  Agree with expansive neurological workup.  Psychiatry will continue to follow.    DIFFERENTIAL:  Neurocognitive disorder  Alcohol use disorder, in remission  OCD *due to another medical condition  R/o MDD due to another medical condition, r/o catatonic features.    PLAN:  - No indication for inpt psychiatric hospitalization, given that current state is most likely due to an underlying neurological illness  - Continue current meds: fluvoxamine 150mg daily, abilify 2.5mg daily, klonopin 0.25mg BID (but hold for sedation; if pt persistently drowsy would make klonopin PRN)  - For agitation not responsive to verbal redirection or other gentle measures, can utilize zyprexa 2.5mg PRN; check EKG for Qtc   - Pt to continue follow up with psychiatrist Dr. Jason Figueroa ([phone]) - this writer has discussed pt's inpatient stay w/ him

## 2024-04-01 NOTE — PROGRESS NOTE ADULT - SUBJECTIVE AND OBJECTIVE BOX
Neurology Progress Note    Interval History:    Patient seen and examined today. No events overnight.     HPI:  38-year-old female with a history of alcohol use disorder (sober for 1 year), OCD, depression, anxiety with 2 prior psychiatric hospitalizations in the past year (follows with psychiatrist Dr. Hernandez, last visit decreased fluvoxamine and Abilify 1-2 weeks ago), currently taking Abilify 2.5 mg QHS, Klonopin 0.25 mg BID, fluvoxamine 150 mg QHS and memantine 10 mg BID. Patient BIBEMS to [facility] for altered mental status. Patient brought in from outpatient facility for inability or perform blood work. As per EMS, patient was too agitated to perform blood work. Patient was aaox4 but babbles words repeatedly and is unable to focus or cooperate with staff. Patient brought in for psych eval. Patient unable to give pertinent psych history.     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 In March and April 2023 patient was admitted for 1 month in 2 different mental health clinics. Although patient has been sober for a year her functional status continues to decline, with a 35 pound weight gain in the past year, poor hygiene, and sleeping most of the day every day. Her  describes how patient becomes more childish, changing her voice and behaving inappropriately. Posteriorly, during the nexts months declining gradually in cognition, activity, memory, personality and calculation, and being more apathetic. In January 2024, patient got worse in a stepwise manner and started to walk worse and fall more often. Patient has been unable to work for at least 2 years.  She is currently under the care of a psychotherapist who recommended that she report to a health care facility for further evaluation and likely admission as patient has been unresponsive to outpatient therapy and medications.  Per  patient has not been coughing or indicating pain of any kind.  No recent illness.     Stepwise cognitive decline over the course of several years, more prominent over the past few weeks, unresponsive to pharmacotherapy/psychiatric intervention in outpt setting, Psych following at [facility].    In [facility] ED:  - Vitals: Normal  - Labs: Normal  - UA: cloudy urine  - Utox: + BZD  - CT head wo: frontotemporal atrophy not characteristic of age.     Transferred to [facility] after discussion with neurology attending Dr. Mccrary for further management.  (27 Mar 2024 22:33)      PAST MEDICAL & SURGICAL HISTORY:  OCD (obsessive compulsive disorder)    Mixed anxiety and depressive disorder    History of alcohol use disorder    No significant past surgical history            Medications:  ARIPiprazole 2.5 milliGRAM(s) Oral at bedtime  clonazePAM Oral Disintegrating Tablet 0.25 milliGRAM(s) Oral two times a day  enoxaparin Injectable 40 milliGRAM(s) SubCutaneous every 24 hours  fluvoxaMINE 150 milliGRAM(s) Oral at bedtime  influenza   Vaccine 0.5 milliLiter(s) IntraMuscular once  memantine 10 milliGRAM(s) Oral two times a day  OLANZapine Disintegrating Tablet 5 milliGRAM(s) Oral daily PRN      Vital Signs Last 24 Hrs  T(C): 36.5 (01 Apr 2024 05:52), Max: 36.8 (31 Mar 2024 12:42)  T(F): 97.7 (01 Apr 2024 05:52), Max: 98.2 (31 Mar 2024 12:42)  HR: 83 (01 Apr 2024 05:52) (83 - 101)  BP: 94/58 (01 Apr 2024 05:52) (94/58 - 106/73)  BP(mean): --  RR: 18 (01 Apr 2024 05:52) (18 - 18)  SpO2: 97% (01 Apr 2024 05:52) (95% - 97%)    Parameters below as of 01 Apr 2024 05:52  Patient On (Oxygen Delivery Method): room air        Neurological Exam:   Mental status: Awake, alert and oriented x3.  Recent and remote memory intact.  Naming, repetition and comprehension intact.  Attention/concentration intact.  No dysarthria, no aphasia.  Fund of knowledge appropriate.    Cranial nerves: Pupils equally round and reactive to light, visual fields full, no nystagmus, extraocular muscles intact, V1 through V3 intact bilaterally and symmetric, face symmetric, hearing intact to finger rub, palate elevation symmetric, tongue was midline.  Motor:  MRC grading 5/5 b/l UE/LE.   strength 5/5.  Normal tone and bulk.  No abnormal movements.    Sensation: Intact to light touch, proprioception, and pinprick.   Coordination: No dysmetria on finger-to-nose and heel-to-shin.  No dysdiadokinesia.  Reflexes: 2+ in bilateral UE/LE, downgoing toes bilaterally. (-) Corbin.  Gait: Narrow and steady. No ataxia.  Romberg negative    Labs:                  Radiology: Reviewed Neurology Progress Note    Interval History:    Patient seen and examined today. No events overnight. She appears slightly somnolent but says she is doing good. She denies having any back pain, vision changes, numbness, weakness, chest pain, or shortness of breath.     HPI:  38-year-old female with a history of alcohol use disorder (sober for 1 year), OCD, depression, anxiety with 2 prior psychiatric hospitalizations in the past year (follows with psychiatrist Dr. Hernandez, last visit decreased fluvoxamine and Abilify 1-2 weeks ago), currently taking Abilify 2.5 mg QHS, Klonopin 0.25 mg BID, fluvoxamine 150 mg QHS and memantine 10 mg BID. Patient BIBEMS to [facility] for altered mental status. Patient brought in from outpatient facility for inability or perform blood work. As per EMS, patient was too agitated to perform blood work. Patient was aaox4 but babbles words repeatedly and is unable to focus or cooperate with staff. Patient brought in for psych eval. Patient unable to give pertinent psych history.     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 In March and April 2023 patient was admitted for 1 month in 2 different mental health clinics. Although patient has been sober for a year her functional status continues to decline, with a 35 pound weight gain in the past year, poor hygiene, and sleeping most of the day every day. Her  describes how patient becomes more childish, changing her voice and behaving inappropriately. Posteriorly, during the nexts months declining gradually in cognition, activity, memory, personality and calculation, and being more apathetic. In January 2024, patient got worse in a stepwise manner and started to walk worse and fall more often. Patient has been unable to work for at least 2 years.  She is currently under the care of a psychotherapist who recommended that she report to a health care facility for further evaluation and likely admission as patient has been unresponsive to outpatient therapy and medications.  Per  patient has not been coughing or indicating pain of any kind.  No recent illness.     Stepwise cognitive decline over the course of several years, more prominent over the past few weeks, unresponsive to pharmacotherapy/psychiatric intervention in outpt setting, Psych following at [facility].    In [facility] ED:  - Vitals: Normal  - Labs: Normal  - UA: cloudy urine  - Utox: + BZD  - CT head wo: frontotemporal atrophy not characteristic of age.     Transferred to [facility] after discussion with neurology attending Dr. Mccrary for further management.  (27 Mar 2024 22:33)      PAST MEDICAL & SURGICAL HISTORY:  OCD (obsessive compulsive disorder)    Mixed anxiety and depressive disorder    History of alcohol use disorder    No significant past surgical history            Medications:  ARIPiprazole 2.5 milliGRAM(s) Oral at bedtime  clonazePAM Oral Disintegrating Tablet 0.25 milliGRAM(s) Oral two times a day  enoxaparin Injectable 40 milliGRAM(s) SubCutaneous every 24 hours  fluvoxaMINE 150 milliGRAM(s) Oral at bedtime  influenza   Vaccine 0.5 milliLiter(s) IntraMuscular once  memantine 10 milliGRAM(s) Oral two times a day  OLANZapine Disintegrating Tablet 5 milliGRAM(s) Oral daily PRN      Vital Signs Last 24 Hrs  T(C): 36.5 (01 Apr 2024 05:52), Max: 36.8 (31 Mar 2024 12:42)  T(F): 97.7 (01 Apr 2024 05:52), Max: 98.2 (31 Mar 2024 12:42)  HR: 83 (01 Apr 2024 05:52) (83 - 101)  BP: 94/58 (01 Apr 2024 05:52) (94/58 - 106/73)  BP(mean): --  RR: 18 (01 Apr 2024 05:52) (18 - 18)  SpO2: 97% (01 Apr 2024 05:52) (95% - 97%)    Parameters below as of 01 Apr 2024 05:52  Patient On (Oxygen Delivery Method): room air        Neurological Exam:   Mental status: Awake, alert and oriented x3.  Recent and remote memory intact. Can recall three words that she has memorized with prompting. Comprehension impaired as she can follow simple commands but not complex ones involving two or more steps (ie. covering one eye and looking straight ahead). Repetition is intact as she can repeat "no ifs ands or buts". Attention is impaired as she can spell the word "world" forwards but not backwards.  No dysarthria, no aphasia.  Fund of knowledge appropriate.    Cranial nerves: Unable to test visual fields as patient unable to follow complex commands. Pupils equally round and reactive to light, no nystagmus, extraocular muscles intact, V1 through V3 intact bilaterally and symmetric, face symmetric, hearing intact to tuning fork, palate elevation symmetric, tongue was midline. Able to shrug shoulders against resistance.  Motor:  MRC grading 5/5 b/l UE/LE.   strength 5/5.  Normal tone and bulk.  No abnormal movements.    Sensation: Intact to light touch, proprioception, and pinprick.   Coordination: No dysmetria on finger-to-nose and heel-to-shin.  No dysdiadokinesia.  Reflexes: 2+ in bilateral UE/LE, downgoing toes bilaterally. (-) Corbin.  Gait: Narrow and steady. No ataxia.  Romberg negative    Labs:                  Radiology: Reviewed Neurology Progress Note    Interval History:    Patient seen and examined today. No events overnight. She appears slightly somnolent but says she is doing good. She denies having any back pain, vision changes, numbness, weakness, chest pain, or shortness of breath.     HPI:  38-year-old female with a history of alcohol use disorder (sober for 1 year), OCD, depression, anxiety with 2 prior psychiatric hospitalizations in the past year (follows with psychiatrist Dr. Hernandez, last visit decreased fluvoxamine and Abilify 1-2 weeks ago), currently taking Abilify 2.5 mg QHS, Klonopin 0.25 mg BID, fluvoxamine 150 mg QHS and memantine 10 mg BID. Patient BIBEMS to [facility] for altered mental status. Patient brought in from outpatient facility for inability or perform blood work. As per EMS, patient was too agitated to perform blood work. Patient was aaox4 but babbles words repeatedly and is unable to focus or cooperate with staff. Patient brought in for psych eval. Patient unable to give pertinent psych history.     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 In March and April 2023 patient was admitted for 1 month in 2 different mental health clinics. Although patient has been sober for a year her functional status continues to decline, with a 35 pound weight gain in the past year, poor hygiene, and sleeping most of the day every day. Her  describes how patient becomes more childish, changing her voice and behaving inappropriately. Posteriorly, during the nexts months declining gradually in cognition, activity, memory, personality and calculation, and being more apathetic. In January 2024, patient got worse in a stepwise manner and started to walk worse and fall more often. Patient has been unable to work for at least 2 years.  She is currently under the care of a psychotherapist who recommended that she report to a health care facility for further evaluation and likely admission as patient has been unresponsive to outpatient therapy and medications.  Per  patient has not been coughing or indicating pain of any kind.  No recent illness.     Stepwise cognitive decline over the course of several years, more prominent over the past few weeks, unresponsive to pharmacotherapy/psychiatric intervention in outpt setting, Psych following at [facility].    In [facility] ED:  - Vitals: Normal  - Labs: Normal  - UA: cloudy urine  - Utox: + BZD  - CT head wo: frontotemporal atrophy not characteristic of age.     Transferred to [facility] after discussion with neurology attending Dr. Mccrary for further management.  (27 Mar 2024 22:33)      PAST MEDICAL & SURGICAL HISTORY:  OCD (obsessive compulsive disorder)    Mixed anxiety and depressive disorder    History of alcohol use disorder    No significant past surgical history            Medications:  ARIPiprazole 2.5 milliGRAM(s) Oral at bedtime  clonazePAM Oral Disintegrating Tablet 0.25 milliGRAM(s) Oral two times a day  enoxaparin Injectable 40 milliGRAM(s) SubCutaneous every 24 hours  fluvoxaMINE 150 milliGRAM(s) Oral at bedtime  influenza   Vaccine 0.5 milliLiter(s) IntraMuscular once  memantine 10 milliGRAM(s) Oral two times a day  OLANZapine Disintegrating Tablet 5 milliGRAM(s) Oral daily PRN      Vital Signs Last 24 Hrs  T(C): 36.5 (01 Apr 2024 05:52), Max: 36.8 (31 Mar 2024 12:42)  T(F): 97.7 (01 Apr 2024 05:52), Max: 98.2 (31 Mar 2024 12:42)  HR: 83 (01 Apr 2024 05:52) (83 - 101)  BP: 94/58 (01 Apr 2024 05:52) (94/58 - 106/73)  BP(mean): --  RR: 18 (01 Apr 2024 05:52) (18 - 18)  SpO2: 97% (01 Apr 2024 05:52) (95% - 97%)    Parameters below as of 01 Apr 2024 05:52  Patient On (Oxygen Delivery Method): room air        Neurological Exam:   Mental status: Drowsy. Oriented to self, location, year.  Language:  Naming intact. Able to follow commands. Perseveration  Cranial nerves: Pupils equally round and reactive to light, no nystagmus, extraocular muscles intact, V1 through V3 intact bilaterally and symmetric, face symmetric, hearing intact to finger rub. Shoulder strength intact. Jaw strength intact. Palate is elevated and symmetric with tongue midline exhibiting normal movements. Intact Blink to threat  Motor: Moves UE and LE spontaneously. Tone is normal   Sensation: Intact to noxious  Coordination: No tremor at rest or abnormal movements.   Reflexes: 2+ biceps 2+patellar. Downgoing toes. +palmaromental (L), +Glabellar. No grasp.   Gait: Deferred    Labs:                  Radiology: Reviewed Neurology Progress Note    Interval History:    Patient seen and examined today. No events overnight. She appears slightly somnolent but says she is doing good. She denies having any back pain, vision changes, numbness, weakness, chest pain, or shortness of breath.     HPI:  38-year-old female with a history of alcohol use disorder (sober for 1 year), OCD, depression, anxiety with 2 prior psychiatric hospitalizations in the past year (follows with psychiatrist Dr. Hernandez, last visit decreased fluvoxamine and Abilify 1-2 weeks ago), currently taking Abilify 2.5 mg QHS, Klonopin 0.25 mg BID, fluvoxamine 150 mg QHS and memantine 10 mg BID. Patient BIBEMS to [facility] for altered mental status. Patient brought in from outpatient facility for inability or perform blood work. As per EMS, patient was too agitated to perform blood work. Patient was aaox4 but babbles words repeatedly and is unable to focus or cooperate with staff. Patient brought in for psych eval. Patient unable to give pertinent psych history.     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 In March and April 2023 patient was admitted for 1 month in 2 different mental health clinics. Although patient has been sober for a year her functional status continues to decline, with a 35 pound weight gain in the past year, poor hygiene, and sleeping most of the day every day. Her  describes how patient becomes more childish, changing her voice and behaving inappropriately. Posteriorly, during the nexts months declining gradually in cognition, activity, memory, personality and calculation, and being more apathetic. In January 2024, patient got worse in a stepwise manner and started to walk worse and fall more often. Patient has been unable to work for at least 2 years.  She is currently under the care of a psychotherapist who recommended that she report to a health care facility for further evaluation and likely admission as patient has been unresponsive to outpatient therapy and medications.  Per  patient has not been coughing or indicating pain of any kind.  No recent illness.     Stepwise cognitive decline over the course of several years, more prominent over the past few weeks, unresponsive to pharmacotherapy/psychiatric intervention in outpt setting, Psych following at [facility].    In [facility] ED:  - Vitals: Normal  - Labs: Normal  - UA: cloudy urine  - Utox: + BZD  - CT head wo: frontotemporal atrophy not characteristic of age.     Transferred to [facility] after discussion with neurology attending Dr. Mccrary for further management.  (27 Mar 2024 22:33)      PAST MEDICAL & SURGICAL HISTORY:  OCD (obsessive compulsive disorder)    Mixed anxiety and depressive disorder    History of alcohol use disorder    No significant past surgical history            Medications:  ARIPiprazole 2.5 milliGRAM(s) Oral at bedtime  clonazePAM Oral Disintegrating Tablet 0.25 milliGRAM(s) Oral two times a day  enoxaparin Injectable 40 milliGRAM(s) SubCutaneous every 24 hours  fluvoxaMINE 150 milliGRAM(s) Oral at bedtime  influenza   Vaccine 0.5 milliLiter(s) IntraMuscular once  memantine 10 milliGRAM(s) Oral two times a day  OLANZapine Disintegrating Tablet 5 milliGRAM(s) Oral daily PRN      Vital Signs Last 24 Hrs  T(C): 36.5 (01 Apr 2024 05:52), Max: 36.8 (31 Mar 2024 12:42)  T(F): 97.7 (01 Apr 2024 05:52), Max: 98.2 (31 Mar 2024 12:42)  HR: 83 (01 Apr 2024 05:52) (83 - 101)  BP: 94/58 (01 Apr 2024 05:52) (94/58 - 106/73)  BP(mean): --  RR: 18 (01 Apr 2024 05:52) (18 - 18)  SpO2: 97% (01 Apr 2024 05:52) (95% - 97%)    Parameters below as of 01 Apr 2024 05:52  Patient On (Oxygen Delivery Method): room air        Neurological Exam:   Mental status: Drowsy. Oriented to self, location, year.  Language:  Naming intact. Able to follow commands. Perseveration  Cranial nerves: Pupils equally round and reactive to light, no nystagmus, extraocular muscles intact, V1 through V3 intact bilaterally and symmetric, face symmetric, hearing intact to finger rub. Shoulder strength intact. Jaw strength intact. Palate is elevated and symmetric with tongue midline exhibiting normal movements. Intact Blink to threat  Motor: Moves UE and LE spontaneously. Tone is normal   Sensation: Intact to noxious  Coordination: No tremor at rest or abnormal movements.   Reflexes: 2+ biceps 2+patellar. Downgoing toes. +palmaromental (L), +Glabellar. No grasp.   Gait: stable    Labs:                  Radiology: Reviewed

## 2024-04-01 NOTE — BH CONSULTATION LIAISON PROGRESS NOTE - NSBHFUPINTERVALHXFT_PSY_A_CORE
Last IM zyprexa 5mg received on 3/27. Pt received zyprexa 5mg PO on 3/28 and 3/31.  PET scan complete confirming atrophy in frontotemporal regions; MRI unable to be completed 2/2 pt agitation. Per primary team, may try again today.  Pt seen today in follow up - father was at bedside. She appeared sleeping but easily awoken. Reports mood is "tired". Able to state name and [place], but when asked what type of place she is at and given choices of "hospital, school, or [employer]?" she answers "[employer]". For date says it is July but states 2024. Pt declined to answer further questions, asking instead to sleep. On exam, no rigidity or cogwheeling felt in b/l upper extremity. She follows simple commands appropriately. No SI or HI endorsed. Does not appear internally stimulated.

## 2024-04-01 NOTE — DISCHARGE NOTE PROVIDER - CARE PROVIDER_API CALL
Jason Figueroa  Psychiatry  Phone: (   )    -  Fax: (   )    -  Established Patient  Follow Up Time: 1 week    Candace Dang  Neuropsychiatry  Phone: (   )    -  Fax: (   )    -  Established Patient  Follow Up Time: 1 week

## 2024-04-01 NOTE — DISCHARGE NOTE NURSING/CASE MANAGEMENT/SOCIAL WORK - PATIENT PORTAL LINK FT
You can access the FollowMyHealth Patient Portal offered by [facility] by registering at the following website: [email or web address]. By joining [employer]’s FollowMyHealth portal, you will also be able to view your health information using other applications (apps) compatible with our system.

## 2024-04-02 LAB
B BURGDOR DNA SPEC QL NAA+PROBE: NEGATIVE — SIGNIFICANT CHANGE UP
CLINICAL BIOCHEMIST REVIEW: SIGNIFICANT CHANGE UP
CLINICAL BIOCHEMIST REVIEW: SIGNIFICANT CHANGE UP
PORPHYRINS RBC-MCNC: 39 MCG/DL — SIGNIFICANT CHANGE UP
PORPHYRINS SERPL-MCNC: <1 MCG/DL — SIGNIFICANT CHANGE UP
PTP REVIEWED BY: SIGNIFICANT CHANGE UP
WNV IGG CSF IA-ACNC: SIGNIFICANT CHANGE UP
WNV IGM CSF IA-ACNC: NEGATIVE — SIGNIFICANT CHANGE UP

## 2024-04-03 LAB
COPPER SERPL-MCNC: 128 UG/DL — SIGNIFICANT CHANGE UP (ref 80–158)
MBP CSF-MCNC: 6.4 NG/ML — HIGH (ref 0–3.7)
OLIGOCLONAL BANDS CSF ELPH-IMP: PRESENT

## 2024-04-04 LAB — MOG AB CSF QL CBA IFA: NEGATIVE — SIGNIFICANT CHANGE UP

## 2024-04-05 LAB
ADMARK PHOSPHO-TAU/TOTAL-TA RESULT: SIGNIFICANT CHANGE UP
COPRO1 24H UR-MRATE: SIGNIFICANT CHANGE UP MCG/L (ref 0.4–4.8)
HEPTA-CP 24H UR-MRATE: SIGNIFICANT CHANGE UP MCG/L
HEPTA-CP SERPL-MCNC: SIGNIFICANT CHANGE UP MCG/L
HEXA-CP 24H UR-MRATE: SIGNIFICANT CHANGE UP MCG/L
PENTA-CP 24H UR-MRATE: 0.7 MCG/L — SIGNIFICANT CHANGE UP
PORPHYRIN FRACT PNL SERPL-MCNC: 0.7 MCG/L — LOW (ref 1–5.6)
PORPHYRINS PLASMA INTERPRETATION: SIGNIFICANT CHANGE UP
UROPOR 24H UR-MRATE: SIGNIFICANT CHANGE UP MCG/L

## 2024-04-08 DIAGNOSIS — F42.9 OBSESSIVE-COMPULSIVE DISORDER, UNSPECIFIED: ICD-10-CM

## 2024-04-08 DIAGNOSIS — G10 HUNTINGTON'S DISEASE: ICD-10-CM

## 2024-04-08 DIAGNOSIS — Z20.822 CONTACT WITH AND (SUSPECTED) EXPOSURE TO COVID-19: ICD-10-CM

## 2024-04-08 DIAGNOSIS — G31.09 OTHER FRONTOTEMPORAL NEUROCOGNITIVE DISORDER: ICD-10-CM

## 2024-04-08 DIAGNOSIS — Z86.16 PERSONAL HISTORY OF COVID-19: ICD-10-CM

## 2024-04-08 DIAGNOSIS — F41.8 OTHER SPECIFIED ANXIETY DISORDERS: ICD-10-CM

## 2024-04-08 DIAGNOSIS — G93.41 METABOLIC ENCEPHALOPATHY: ICD-10-CM

## 2024-04-08 DIAGNOSIS — F29 UNSPECIFIED PSYCHOSIS NOT DUE TO A SUBSTANCE OR KNOWN PHYSIOLOGICAL CONDITION: ICD-10-CM

## 2024-04-08 DIAGNOSIS — F06.71 MILD NEUROCOGNITIVE DISORDER DUE TO KNOWN PHYSIOLOGICAL CONDITION WITH BEHAVIORAL DISTURBANCE: ICD-10-CM

## 2024-04-08 DIAGNOSIS — F06.1 CATATONIC DISORDER DUE TO KNOWN PHYSIOLOGICAL CONDITION: ICD-10-CM

## 2024-04-08 LAB
14-3-3 AG CSF-MCNC: SIGNIFICANT CHANGE UP
AMPA-R AB CBA, CSF: NEGATIVE — SIGNIFICANT CHANGE UP
AMPHIPHYSIN AB TITR CSF: NEGATIVE — SIGNIFICANT CHANGE UP
CASPR2-IGG CBA, CSF: NEGATIVE — SIGNIFICANT CHANGE UP
CV2 IGG TITR CSF: NEGATIVE — SIGNIFICANT CHANGE UP
DPPX ANTIBODY IFA, CSF: NEGATIVE — SIGNIFICANT CHANGE UP
GABA-B-R AB CBA, CSF: NEGATIVE — SIGNIFICANT CHANGE UP
GAD65 AB CSF-SCNC: 0 NMOL/L — SIGNIFICANT CHANGE UP
GFAP IFA, CSF: NEGATIVE — SIGNIFICANT CHANGE UP
GLIAL NUC TYPE 1 AB TITR CSF: NEGATIVE — SIGNIFICANT CHANGE UP
H CAPSUL AG CSF IA-MCNC: SIGNIFICANT CHANGE UP
HU1 AB TITR CSF IF: NEGATIVE — SIGNIFICANT CHANGE UP
HU2 AB TITR CSF IF: NEGATIVE — SIGNIFICANT CHANGE UP
HU3 AB TITR CSF: NEGATIVE — SIGNIFICANT CHANGE UP
IFA NOTES: SIGNIFICANT CHANGE UP
IGLON5 IFA, CSF: NEGATIVE — SIGNIFICANT CHANGE UP
IMMUNOLOGIST REVIEW: SIGNIFICANT CHANGE UP
LGI1-IGG CBA, CSF: NEGATIVE — SIGNIFICANT CHANGE UP
MGLUR1 AB IFA, CSF: NEGATIVE — SIGNIFICANT CHANGE UP
PCA-TR AB TITR CSF: NEGATIVE — SIGNIFICANT CHANGE UP
PURKINJE CELL CYTOPLASMIC AB TYPE 2: NEGATIVE — SIGNIFICANT CHANGE UP
PURKINJE CELLS AB TITR CSF IF: NEGATIVE — SIGNIFICANT CHANGE UP

## 2024-04-09 LAB
LYME IGG LINE BLOT INTERP.: NEGATIVE — SIGNIFICANT CHANGE UP
LYME IGM LINE BLOT INTERP.: NEGATIVE — SIGNIFICANT CHANGE UP
P18 AB. IGG: SIGNIFICANT CHANGE UP
P23 AB. IGG: SIGNIFICANT CHANGE UP
P23 AB. IGM: SIGNIFICANT CHANGE UP
P28 AB. IGG: SIGNIFICANT CHANGE UP
P30 AB. IGG: SIGNIFICANT CHANGE UP
P39 AB. IGG: SIGNIFICANT CHANGE UP
P39 AB. IGM: SIGNIFICANT CHANGE UP
P41 AB. IGG: SIGNIFICANT CHANGE UP
P41 AB. IGM: SIGNIFICANT CHANGE UP
P45 AB. IGG: SIGNIFICANT CHANGE UP
P58 AB. IGG: SIGNIFICANT CHANGE UP
P66 AB. IGG: SIGNIFICANT CHANGE UP
P93 AB. IGG: SIGNIFICANT CHANGE UP

## 2024-04-11 LAB
CULTURE RESULTS: NO GROWTH — SIGNIFICANT CHANGE UP
SPECIMEN SOURCE: SIGNIFICANT CHANGE UP

## 2024-04-13 LAB
GENETICIST REVIEW: SIGNIFICANT CHANGE UP
HTT GENE CAG RPT ENTNUM BLD/T: SIGNIFICANT CHANGE UP
HUNTINGTON DISEASE ANALYSIS REFERRAL REASON: SIGNIFICANT CHANGE UP
MOL DX INTERP BLD/T QL: NEGATIVE — SIGNIFICANT CHANGE UP
PROVIDER SIGNING NAME: SIGNIFICANT CHANGE UP
SPECIMEN SOURCE: SIGNIFICANT CHANGE UP

## 2024-04-27 LAB
CULTURE RESULTS: SIGNIFICANT CHANGE UP
SPECIMEN SOURCE: SIGNIFICANT CHANGE UP

## 2024-05-02 PROBLEM — F41.8 OTHER SPECIFIED ANXIETY DISORDERS: Chronic | Status: ACTIVE | Noted: 2024-03-27

## 2024-05-02 PROBLEM — F42.9 OBSESSIVE-COMPULSIVE DISORDER, UNSPECIFIED: Chronic | Status: ACTIVE | Noted: 2024-03-27

## 2024-05-02 PROBLEM — Z87.898 PERSONAL HISTORY OF OTHER SPECIFIED CONDITIONS: Chronic | Status: ACTIVE | Noted: 2024-03-27

## 2024-05-06 ENCOUNTER — APPOINTMENT (OUTPATIENT)
Dept: NEUROLOGY | Facility: CLINIC | Age: 39
End: 2024-05-06

## 2024-05-06 PROBLEM — Z00.00 ENCOUNTER FOR PREVENTIVE HEALTH EXAMINATION: Status: ACTIVE | Noted: 2024-05-06

## 2024-09-10 NOTE — ED ADULT NURSE NOTE - NS ED PATIENT SAFETY CONERN FT
Detail Level: Detailed
Size Of Lesion: 1
X Size Of Lesion In Cm (Optional): 0.9
Name Of The Referring Provider For Procedure: Dr. Narvaez
Incorporate Mauc In Note: Yes
at risk for elopement/ failure to thrive
